# Patient Record
Sex: FEMALE
[De-identification: names, ages, dates, MRNs, and addresses within clinical notes are randomized per-mention and may not be internally consistent; named-entity substitution may affect disease eponyms.]

---

## 2017-04-10 ENCOUNTER — HOSPITAL ENCOUNTER (EMERGENCY)
Dept: HOSPITAL 14 - H.ER | Age: 52
Discharge: HOME | End: 2017-04-10
Payer: SELF-PAY

## 2017-04-10 VITALS
SYSTOLIC BLOOD PRESSURE: 132 MMHG | TEMPERATURE: 98 F | DIASTOLIC BLOOD PRESSURE: 73 MMHG | OXYGEN SATURATION: 100 % | HEART RATE: 83 BPM

## 2017-04-10 VITALS — BODY MASS INDEX: 31.9 KG/M2

## 2017-04-10 VITALS — RESPIRATION RATE: 16 BRPM

## 2017-04-10 DIAGNOSIS — R00.2: ICD-10-CM

## 2017-04-10 DIAGNOSIS — E11.9: ICD-10-CM

## 2017-04-10 DIAGNOSIS — F41.9: Primary | ICD-10-CM

## 2017-04-10 DIAGNOSIS — I10: ICD-10-CM

## 2017-04-10 DIAGNOSIS — E78.00: ICD-10-CM

## 2017-04-10 DIAGNOSIS — R07.9: ICD-10-CM

## 2017-04-10 DIAGNOSIS — Z86.711: ICD-10-CM

## 2017-04-10 LAB
T4 SERPL-MCNC: 10.4 UG/DL (ref 5.5–11)
TSH SERPL-ACNC: 0.13 MIU/ML (ref 0.46–4.68)

## 2017-04-10 NOTE — ED PDOC
HPI: Psych/Substance Abuse


Time Seen by Provider: 04/10/17 14:04


Chief Complaint (Nursing): Chest Pain


History Per: Patient (Anxious, palpitations  worried about health and work. 

[place] MARLOW/HI)


Onset/Duration Of Symptoms: Days (2)


Current Symptoms Are (Timing): Intermittent Episodes


Suicide/Self Injury Attempted (Context): None


Modifying Factor(s): None


Severity: Mild


Associated Symptoms: Anxiety





Past Medical History


Vital Signs: 





 Last Vital Signs











Temp  98.3 F   04/10/17 13:55


 


Pulse  81   04/10/17 13:55


 


Resp  16   04/10/17 13:55


 


BP  156/99 H  04/10/17 13:55


 


Pulse Ox  99   04/10/17 13:55














- Medical History


PMH: Bronchitis, Diabetes, HTN, Hypercholesterolemia, Hyperlipidemia, 

Hypothyroidism, Pulmonary Embolism


   Denies: HIV, Chronic Kidney Disease





- Family History


Family History: States: Unknown Family Hx





- Immunization History


Hx Tetanus Toxoid Vaccination: No


Hx Influenza Vaccination: No


Hx Pneumococcal Vaccination: No





- Home Medications


Home Medications: 


 Ambulatory Orders











 Medication  Instructions  Recorded


 


Glimepiride [Amaryl] 4 mg PO DAILY 01/03/16


 


Metformin HCl 1,000 mg PO BID 01/03/16


 


Ramipril [Altace] 10 mg PO DAILY 01/03/16


 


Cyanocobalamin [Vitamin B12 1000 1,000 mcg PO DAILY #0 tab 01/05/16





mcg Tab]  


 


Levothyroxine [Levoxyl] 0.125 mg PO DAILY 01/25/16


 


Simvastatin 20 mg PO DAILY 01/25/16


 


diaZEpam [Valium] 2 mg PO BID PRN #6 tab 05/21/16


 


Naproxen [Naprosyn] 500 mg PO BID PRN #15 tablet 08/28/16


 


Erythromycin 0.5% [Erythromycin] 1 applic .ROUTE QID #1 tube 09/22/16


 


Acetaminophen with Codeine 1 tab PO Q6H PRN #15 tab 09/25/16





[Tylenol with Codeine No. 3 300  





mg-30 mg]  


 


Clindamycin [Cleocin] 300 mg PO QID #40 cap 09/25/16


 


Albuterol HFA [Ventolin HFA 90 2 puff IH Q4H #1 puff 11/09/16





mcg/actuation (8 g)]  


 


Azithromycin [Zithromax] 250 mg PO DAILY #6 tab 11/09/16


 


predniSONE [predniSONE Tab] 10 mg PO TID #15 tab 11/09/16


 


Albuterol HFA [Ventolin HFA 90 2 puff IH [id number] PRN #1 bottle 11/20/16





mcg/actuation (8 g)]  


 


Levofloxacin [Levaquin] 750 mg PO DAILY #5 tablet 11/20/16


 


Hydroxyzine HCl 25 mg PO HS #6 tablet 04/10/17














- Allergies


Allergies/Adverse Reactions: 


 Allergies











Allergy/AdvReac Type Severity Reaction Status Date / Time


 


Iodine and Iodide Containing Allergy  RASH Verified 02/20/17 23:54





Produc     














Review of Systems


Cardiovascular: Positive for: Palpitations


Psych: Positive for: Anxiety





Physical Exam





- Physical Exam


Appears: Positive for: Non-toxic, No Acute Distress


Skin: Positive for: Normal Color, Warm, DRY


Cardiovascular/Chest: Positive for: Regular Rate, Rhythm


Respiratory: Positive for: CNT, Normal Breath Sounds


Neurologic/Psych: Positive for: Alert, Oriented





- ECG


O2 Sat by Pulse Oximetry: 99





Disposition





- Clinical Impression


Clinical Impression: 


 Anxiety





- Patient ED Disposition


Is Patient to be Admitted: No





- Disposition


Referrals: 


[facility] [Outside]


Disposition: Routine/Home


Disposition Time: 16:14


Condition: FAIR


Prescriptions: 


Hydroxyzine HCl 25 mg PO HS #6 tablet


Instructions:  Anxiety (ED)

## 2017-04-12 ENCOUNTER — HOSPITAL ENCOUNTER (EMERGENCY)
Dept: HOSPITAL 14 - H.ER | Age: 52
Discharge: HOME | End: 2017-04-12
Payer: MEDICAID

## 2017-04-12 VITALS — HEART RATE: 79 BPM | OXYGEN SATURATION: 98 % | TEMPERATURE: 98.1 F

## 2017-04-12 VITALS — DIASTOLIC BLOOD PRESSURE: 84 MMHG | SYSTOLIC BLOOD PRESSURE: 142 MMHG | RESPIRATION RATE: 18 BRPM

## 2017-04-12 VITALS — BODY MASS INDEX: 31.9 KG/M2

## 2017-04-12 DIAGNOSIS — F41.9: ICD-10-CM

## 2017-04-12 DIAGNOSIS — E03.9: Primary | ICD-10-CM

## 2017-04-12 LAB
ALBUMIN/GLOB SERPL: 1.2 {RATIO} (ref 1–2.1)
ALP SERPL-CCNC: 46 U/L (ref 38–126)
ALT SERPL-CCNC: 31 U/L (ref 9–52)
AST SERPL-CCNC: 33 U/L (ref 14–36)
BASOPHILS # BLD AUTO: 0 K/UL (ref 0–0.2)
BASOPHILS NFR BLD: 0.6 % (ref 0–2)
BILIRUB SERPL-MCNC: 0.5 MG/DL (ref 0.2–1.3)
BUN SERPL-MCNC: 12 MG/DL (ref 7–17)
CALCIUM SERPL-MCNC: 9.7 MG/DL (ref 8.4–10.2)
CHLORIDE SERPL-SCNC: 104 MMOL/L (ref 98–107)
CO2 SERPL-SCNC: 28 MMOL/L (ref 22–30)
EOSINOPHIL # BLD AUTO: 0.2 K/UL (ref 0–0.7)
EOSINOPHIL NFR BLD: 4.1 % (ref 0–4)
ERYTHROCYTE [DISTWIDTH] IN BLOOD BY AUTOMATED COUNT: 13.6 % (ref 11.5–14.5)
FT3: 3.32 PG/ML (ref 2.77–5.27)
GLOBULIN SER-MCNC: 3.3 GM/DL (ref 2.2–3.9)
GLUCOSE SERPL-MCNC: 157 MG/DL (ref 65–105)
HCT VFR BLD CALC: 40.9 % (ref 34–47)
LYMPHOCYTES # BLD AUTO: 1.5 K/UL (ref 1–4.3)
LYMPHOCYTES NFR BLD AUTO: 31.3 % (ref 20–40)
MCH RBC QN AUTO: 27.8 PG (ref 27–31)
MCHC RBC AUTO-ENTMCNC: 32.5 G/DL (ref 33–37)
MCV RBC AUTO: 85.5 FL (ref 81–99)
MONOCYTES # BLD: 0.4 K/UL (ref 0–0.8)
MONOCYTES NFR BLD: 8.6 % (ref 0–10)
NEUTROPHILS # BLD: 2.6 K/UL (ref 1.8–7)
NEUTROPHILS NFR BLD AUTO: 55.4 % (ref 50–75)
NRBC BLD AUTO-RTO: 0 % (ref 0–0)
PLATELET # BLD: 179 K/UL (ref 130–400)
PMV BLD AUTO: 9.1 FL (ref 7.2–11.7)
POTASSIUM SERPL-SCNC: 4.6 MMOL/L (ref 3.6–5)
PROT SERPL-MCNC: 7.3 G/DL (ref 6.3–8.2)
SODIUM SERPL-SCNC: 144 MMOL/L (ref 132–148)
TSH SERPL-ACNC: 0.13 MIU/ML (ref 0.46–4.68)
WBC # BLD AUTO: 4.6 K/UL (ref 4.8–10.8)

## 2017-04-12 NOTE — ED PDOC
HPI: General Adult


Time Seen by Provider: 17 14:03


Chief Complaint (Nursing): Anxiety


Chief Complaint (Provider): anxious


History Per: Patient


Additional Complaint(s): 


52 year old female with history of hypothyroidism, HTN and high cholesterol 

presents with increasing anxiety and nervousness for the past few weeks.  

Patient was seen on 4/10/17 for same and was sent home with rx hydroxyzine.  

She is concerned her thyroid meds have to be adjusted.  She states last year 

she has similar symptoms and her thyroid med dose was increased at that time 

and her symptoms resolved.  Patient denies any chest pain, SOB or EDOUARD, denies 

any palpitations but she has panic sensation. No associated suicidal or 

homicidal ideation, no fever or chills.





Past Medical History


Reviewed: Historical Data, Nursing Documentation, Vital Signs


Vital Signs: 


 Last Vital Signs











Temp  98.1 F   17 13:34


 


Pulse  79   17 17:41


 


Resp  18   17 17:41


 


BP  142/84   17 17:41


 


Pulse Ox  98   17 18:33














- Medical History


PMH: Anxiety, Diabetes, HTN, Hypercholesterolemia, Hyperlipidemia, 

Hypothyroidism





- Surgical History


Surgical History: No Surg Hx





- Family History


Family History: States: No Known Family Hx





- Living Arrangements


Living Arrangements: With Family





- Social History


Current smoker - smoking cessation education provided: No


Alcohol: None


Drugs: Denies





- Home Medications


Home Medications: 


 Ambulatory Orders











 Medication  Instructions  Recorded


 


Glimepiride [Amaryl] 4 mg PO DAILY 16


 


Metformin HCl 1,000 mg PO BID 16


 


Ramipril [Altace] 10 mg PO DAILY 16


 


Cyanocobalamin [Vitamin B12 1000 1,000 mcg PO DAILY #0 tab 16





mcg Tab]  


 


Levothyroxine [Levoxyl] 0.125 mg PO DAILY 16


 


Simvastatin 20 mg PO DAILY 16


 


diaZEpam [Valium] 2 mg PO BID PRN #6 tab 16


 


Naproxen [Naprosyn] 500 mg PO BID PRN #15 tablet 16


 


Erythromycin 0.5% [Erythromycin] 1 applic .ROUTE QID #1 tube 16


 


Acetaminophen with Codeine 1 tab PO Q6H PRN #15 tab 16





[Tylenol with Codeine No. 3 300  





mg-30 mg]  


 


Clindamycin [Cleocin] 300 mg PO QID #40 cap 16


 


Albuterol HFA [Ventolin HFA 90 2 puff IH Q4H #1 puff 16





mcg/actuation (8 g)]  


 


Azithromycin [Zithromax] 250 mg PO DAILY #6 tab 16


 


predniSONE [predniSONE Tab] 10 mg PO TID #15 tab 16


 


Albuterol HFA [Ventolin HFA 90 2 puff IH [id number] PRN #1 bottle 16





mcg/actuation (8 g)]  


 


Levofloxacin [Levaquin] 750 mg PO DAILY #5 tablet 16


 


Hydroxyzine HCl 25 mg PO HS #6 tablet 04/10/17


 


Levothyroxine [Synthroid] 125 mcg PO DAILY #30 tab 17














- Allergies


Allergies/Adverse Reactions: 


 Allergies











Allergy/AdvReac Type Severity Reaction Status Date / Time


 


Iodine and Iodide Containing Allergy  RASH Verified 17 23:54





Produc     














Review of Systems


ROS Statement: Except As Marked, All Systems Reviewed And Found Negative


Constitutional: Negative for: Fever, Chills, Weakness


Cardiovascular: Negative for: Chest Pain, Palpitations


Respiratory: Negative for: Shortness of Breath, SOB with Exertion


Gastrointestinal: Negative for: Nausea, Vomiting


Neurological: Negative for: Headache, Dizziness


Psych: Positive for: Anxiety





Physical Exam





- Reviewed


Nursing Documentation Reviewed: Yes


Vital Signs Reviewed: Yes





- Physical Exam


Appears: Positive for: Well, Non-toxic, No Acute Distress


Skin: Negative for: Rash


Eye Exam: Positive for: Normal appearance, EOMI, PERRL


Cardiovascular/Chest: Positive for: Regular Rate, Rhythm


Respiratory: Positive for: Normal Breath Sounds


Extremity: Positive for: Normal ROM.  Negative for: Pedal Edema


Neurologic/Psych: Positive for: Alert, Oriented





- Laboratory Results


Result Diagrams: 


 17 14:25





 17 14:25





- ECG


O2 Sat by Pulse Oximetry: 98


Pulse Ox Interpretation: Normal





Medical Decision Making


Medical Decision Makin year old with anxiety





Plan:


CBC


CMP


TSH


T3


T4





Patient was offered crisis consultation she declines, denies suicidal or 

homicidal ideation.





TSH is low.  I spoke with family practice resident who states to adjust patient'

s levothyroxine dose.  Patient's dose was adjusted from 100 mcg daily to 125 

mcg dialy.  Rx was given.  Patient was instructed to stop current 100 g tablet 

and continue with prescription instead.  Patient has follow up with clinic on  and she was instructed to keep this appointment.  





Disposition





- Clinical Impression


Clinical Impression: 


 Hypothyroidism





- Patient ED Disposition


Is Patient to be Admitted: No


Counseled Patient/Family Regarding: Studies Performed, Diagnosis, Need For 

Followup, Rx Given





- Disposition


Referrals: 


[facility] [Outside]


Disposition: Routine/Home


Disposition Time: 17:28


Condition: STABLE


Additional Instructions: 


Stop taking current thyroid medication and continue with the new dose 

prescribed today. Keep your appointment on  with the clinic for follow-

up. Return any time to ED if acutely worse.


Prescriptions: 


Levothyroxine [Synthroid] 125 mcg PO DAILY #30 tab


Instructions:  Hypothyroidism (ED)





Results





- Lab Results


Lab Results: 

















  17





  14:25


 


WBC  4.6 L


 


RBC  4.79


 


Hgb  13.3


 


Hct  40.9


 


MCV  85.5


 


MCH  27.8


 


MCHC  32.5 L


 


RDW  13.6


 


Plt Count  179


 


MPV  9.1


 


Neut % (Auto)  55.4


 


Lymph % (Auto)  31.3


 


Mono % (Auto)  8.6


 


Eos % (Auto)  4.1 H


 


Baso % (Auto)  0.6


 


Neut #  2.6


 


Lymph #  1.5


 


Mono #  0.4


 


Eos #  0.2


 


Baso #  0.0


 


Sodium  144


 


Potassium  4.6


 


Chloride  104


 


Carbon Dioxide  28


 


Anion Gap  17


 


BUN  12


 


Creatinine  0.6 L


 


Est GFR ( Amer)  > 60


 


Est GFR (Non-Af Amer)  > 60


 


Random Glucose  157 H


 


Calcium  9.7


 


Total Bilirubin  0.5


 


AST  33


 


ALT  31


 


Alkaline Phosphatase  46


 


Total Protein  7.3


 


Albumin  4.0


 


Globulin  3.3


 


Albumin/Globulin Ratio  1.2


 


Free T4  1.25


 


TSH 3rd Generation  0.13 L

## 2017-08-18 ENCOUNTER — HOSPITAL ENCOUNTER (EMERGENCY)
Dept: HOSPITAL 14 - H.ER | Age: 52
Discharge: HOME | End: 2017-08-18
Payer: MEDICAID

## 2017-08-18 VITALS
TEMPERATURE: 97.6 F | HEART RATE: 78 BPM | OXYGEN SATURATION: 98 % | DIASTOLIC BLOOD PRESSURE: 78 MMHG | RESPIRATION RATE: 19 BRPM | SYSTOLIC BLOOD PRESSURE: 128 MMHG

## 2017-08-18 VITALS — BODY MASS INDEX: 31.9 KG/M2

## 2017-08-18 DIAGNOSIS — E11.9: ICD-10-CM

## 2017-08-18 DIAGNOSIS — E78.5: ICD-10-CM

## 2017-08-18 DIAGNOSIS — I10: ICD-10-CM

## 2017-08-18 DIAGNOSIS — F41.9: ICD-10-CM

## 2017-08-18 DIAGNOSIS — Z86.711: ICD-10-CM

## 2017-08-18 DIAGNOSIS — E03.9: Primary | ICD-10-CM

## 2017-08-18 LAB
BASOPHILS # BLD AUTO: 0 K/UL (ref 0–0.2)
BASOPHILS NFR BLD: 0.5 % (ref 0–2)
BUN SERPL-MCNC: 19 MG/DL (ref 7–17)
CALCIUM SERPL-MCNC: 9.2 MG/DL (ref 8.4–10.2)
CHLORIDE SERPL-SCNC: 101 MMOL/L (ref 98–107)
CO2 SERPL-SCNC: 25 MMOL/L (ref 22–30)
EOSINOPHIL # BLD AUTO: 0.1 K/UL (ref 0–0.7)
EOSINOPHIL NFR BLD: 2.4 % (ref 0–4)
ERYTHROCYTE [DISTWIDTH] IN BLOOD BY AUTOMATED COUNT: 13.6 % (ref 11.5–14.5)
GLUCOSE SERPL-MCNC: 207 MG/DL (ref 65–105)
HCT VFR BLD CALC: 38.7 % (ref 34–47)
LYMPHOCYTES # BLD AUTO: 1.5 K/UL (ref 1–4.3)
LYMPHOCYTES NFR BLD AUTO: 31.8 % (ref 20–40)
MCH RBC QN AUTO: 28.2 PG (ref 27–31)
MCHC RBC AUTO-ENTMCNC: 33.4 G/DL (ref 33–37)
MCV RBC AUTO: 84.2 FL (ref 81–99)
MONOCYTES # BLD: 0.4 K/UL (ref 0–0.8)
MONOCYTES NFR BLD: 7.5 % (ref 0–10)
NEUTROPHILS # BLD: 2.7 K/UL (ref 1.8–7)
NEUTROPHILS NFR BLD AUTO: 57.8 % (ref 50–75)
NRBC BLD AUTO-RTO: 0 % (ref 0–0)
PLATELET # BLD: 219 K/UL (ref 130–400)
PMV BLD AUTO: 8.9 FL (ref 7.2–11.7)
POTASSIUM SERPL-SCNC: 4.6 MMOL/L (ref 3.6–5)
SODIUM SERPL-SCNC: 136 MMOL/L (ref 132–148)
TSH SERPL-ACNC: 6.24 MIU/ML (ref 0.46–4.68)
WBC # BLD AUTO: 4.7 K/UL (ref 4.8–10.8)

## 2017-08-18 NOTE — ED PDOC
HPI: General Adult


Time Seen by Provider: 08/18/17 08:13


Chief Complaint (Nursing): Weakness/Neurological Deficit


Chief Complaint (Provider): Generalized Weakness


History Per: Patient


History/Exam Limitations: no limitations


Onset/Duration Of Symptoms: Days (x2 weeks )


Current Symptoms Are (Timing): Still Present


Additional Complaint(s): 





53 y/o female with a past medical history of hypertension, diabetes, and 

hypothyroidism who presents to the emergency department with a complaint of 

generalized weakness, fatigue, low energy, and intermittent nausea x2 weeks. 

States she was experiencing chest pain on/off but had resolved since. Denies 

vomiting, dizziness, syncope, unusual bleeding, diarrhea, fever, or chills.








PMD: Clinic 





Past Medical History


Reviewed: Historical Data, Nursing Documentation, Vital Signs


Vital Signs: 


 Last Vital Signs











Temp  98.4 F   08/18/17 08:21


 


Pulse  82   08/18/17 08:21


 


Resp  20   08/18/17 08:21


 


BP  157/85 H  08/18/17 08:21


 


Pulse Ox  99   08/18/17 09:44














- Medical History


PMH: Anxiety, Bronchitis, Diabetes, HTN, Hypercholesterolemia, Hyperlipidemia, 

Hypothyroidism, Pulmonary Embolism


   Denies: HIV, Chronic Kidney Disease





- Family History


Family History: States: Unknown Family Hx





- Immunization History


Hx Tetanus Toxoid Vaccination: No


Hx Influenza Vaccination: No


Hx Pneumococcal Vaccination: No





- Home Medications


Home Medications: 


 Ambulatory Orders











 Medication  Instructions  Recorded


 


Glimepiride [Amaryl] 4 mg PO DAILY 01/03/16


 


Metformin HCl 1,000 mg PO BID 01/03/16


 


Ramipril [Altace] 10 mg PO DAILY 01/03/16


 


Cyanocobalamin [Vitamin B12 1000 1,000 mcg PO DAILY #0 tab 01/05/16





mcg Tab]  


 


Levothyroxine [Levoxyl] 0.125 mg PO DAILY 01/25/16


 


Simvastatin 20 mg PO DAILY 01/25/16


 


diaZEpam [Valium] 2 mg PO BID PRN #6 tab 05/21/16


 


Naproxen [Naprosyn] 500 mg PO BID PRN #15 tablet 08/28/16


 


Erythromycin 0.5% [Erythromycin] 1 applic .ROUTE QID #1 tube 09/22/16


 


Acetaminophen with Codeine 1 tab PO Q6H PRN #15 tab 09/25/16





[Tylenol with Codeine No. 3 300  





mg-30 mg]  


 


Clindamycin [Cleocin] 300 mg PO QID #40 cap 09/25/16


 


Albuterol HFA [Ventolin HFA 90 2 puff IH Q4H #1 puff 11/09/16





mcg/actuation (8 g)]  


 


Azithromycin [Zithromax] 250 mg PO DAILY #6 tab 11/09/16


 


predniSONE [predniSONE Tab] 10 mg PO TID #15 tab 11/09/16


 


Albuterol HFA [Ventolin HFA 90 2 puff IH [id number] PRN #1 bottle 11/20/16





mcg/actuation (8 g)]  


 


Levofloxacin [Levaquin] 750 mg PO DAILY #5 tablet 11/20/16


 


Hydroxyzine HCl 25 mg PO HS #6 tablet 04/10/17


 


Levothyroxine [Synthroid] 125 mcg PO DAILY #30 tab 04/12/17














- Allergies


Allergies/Adverse Reactions: 


 Allergies











Allergy/AdvReac Type Severity Reaction Status Date / Time


 


Iodine and Iodide Containing Allergy  RASH Verified 08/18/17 08:20





Produc     














Review of Systems


ROS Statement: Except As Marked, All Systems Reviewed And Found Negative


Constitutional: Positive for: Weakness (Generalized), Other (Fatigue and low 

energy).  Negative for: Fever, Chills


Cardiovascular: Positive for: Chest Pain (On/off but had resolved since)


Gastrointestinal: Positive for: Nausea (Intermittent).  Negative for: Vomiting, 

Diarrhea


Neurological: Negative for: Dizziness, Other (Syncope or bleeding)





Physical Exam





- Reviewed


Nursing Documentation Reviewed: Yes


Vital Signs Reviewed: Yes





- Physical Exam


Appears: Positive for: Non-toxic, No Acute Distress


Head Exam: Positive for: ATRAUMATIC, NORMAL INSPECTION, NORMOCEPHALIC


Skin: Positive for: Normal Color, Warm, Dry


Eye Exam: Positive for: Normal appearance


ENT: Positive for: Normal ENT Inspection.  Negative for: Pharyngeal Erythema


Neck: Positive for: Normal, Supple


Cardiovascular/Chest: Positive for: Regular Rate, Rhythm.  Negative for: Murmur


Respiratory: Positive for: Normal Breath Sounds.  Negative for: Accessory 

Muscle Use, Respiratory Distress


Gastrointestinal/Abdominal: Positive for: Normal Exam, Soft.  Negative for: 

Tenderness


Extremity: Positive for: Normal ROM.  Negative for: Pedal Edema


Neurologic/Psych: Positive for: Alert, Oriented





- Laboratory Results


Result Diagrams: 


 08/18/17 09:00





 08/18/17 09:00





- ECG


ECG Rhythm: Positive for: Normal QRS, Normal ST Segment, Sinus Rhythm (Rate at 

69 bpm).  Negative for: ST/T Changes


O2 Sat by Pulse Oximetry: 99 (RA)


Pulse Ox Interpretation: Normal





Medical Decision Making


Medical Decision Making: 





Time: 08:53


Initial impression: Generalized fatigue. Differential includes hypothyroidism, 

UTI, and hyperglycemia


Initial plan:


--EKG


--BMP


--TSH


--Troponin I


--Urine DIP


--Reevaluation 





Scribe Attestation:


Documented by Maite Ewing, acting as a scribe for Ene Whiteside MD.





Provider Scribe Attestation:


All medical record entries made by the Scribe were at my direction and 

personally dictated by me. I have reviewed the chart and agree that the record 

accurately reflects my personal performance of the history, physical exam, 

medical decision making, and the department course for this patient. I have 

also personally directed, reviewed, and agree with the discharge instructions 

and disposition.





Disposition





- Clinical Impression


Clinical Impression: 


 Hypothyroidism








- Patient ED Disposition


Is Patient to be Admitted: No


Doctor Will See Patient In The: Office


Counseled Patient/Family Regarding: Studies Performed, Diagnosis, Need For 

Followup





- Disposition


Referrals: 


[facility] [Outside]


Disposition: Routine/Home


Disposition Time: 10:23


Condition: GOOD


Additional Instructions: 


Drink plenty of fluids. Take your medications as instructed Follow up with your 

PCP in 2-3 days.


Instructions:  Hypothyroidism (ED)

## 2017-08-18 NOTE — CARD
--------------- APPROVED REPORT --------------





EKG Measurement

Heart [id number]

FL [id number]

[id number]

[id number]

[id number]



<Conclusion>

Normal sinus rhythm

Normal ECG

## 2017-11-16 ENCOUNTER — HOSPITAL ENCOUNTER (OUTPATIENT)
Dept: HOSPITAL 14 - H.ER | Age: 52
Setting detail: OBSERVATION
LOS: 1 days | Discharge: HOME | End: 2017-11-17
Attending: FAMILY MEDICINE | Admitting: FAMILY MEDICINE
Payer: MEDICAID

## 2017-11-16 VITALS — BODY MASS INDEX: 30.5 KG/M2

## 2017-11-16 DIAGNOSIS — I10: ICD-10-CM

## 2017-11-16 DIAGNOSIS — E78.00: ICD-10-CM

## 2017-11-16 DIAGNOSIS — E11.9: ICD-10-CM

## 2017-11-16 DIAGNOSIS — E89.0: ICD-10-CM

## 2017-11-16 DIAGNOSIS — Z79.84: ICD-10-CM

## 2017-11-16 DIAGNOSIS — Z87.891: ICD-10-CM

## 2017-11-16 DIAGNOSIS — E87.5: ICD-10-CM

## 2017-11-16 DIAGNOSIS — E78.5: ICD-10-CM

## 2017-11-16 DIAGNOSIS — Z79.899: ICD-10-CM

## 2017-11-16 DIAGNOSIS — J40: ICD-10-CM

## 2017-11-16 DIAGNOSIS — Z79.82: ICD-10-CM

## 2017-11-16 DIAGNOSIS — Z83.3: ICD-10-CM

## 2017-11-16 DIAGNOSIS — F39: ICD-10-CM

## 2017-11-16 DIAGNOSIS — Z86.711: ICD-10-CM

## 2017-11-16 DIAGNOSIS — F41.9: ICD-10-CM

## 2017-11-16 DIAGNOSIS — R07.89: Primary | ICD-10-CM

## 2017-11-16 DIAGNOSIS — Z85.850: ICD-10-CM

## 2017-11-16 DIAGNOSIS — Z98.51: ICD-10-CM

## 2017-11-16 LAB
APTT BLD: 25.7 SECONDS (ref 25.6–37.1)
BASOPHILS # BLD AUTO: 0 K/UL (ref 0–0.2)
BASOPHILS NFR BLD: 0.4 % (ref 0–2)
BUN SERPL-MCNC: 20 MG/DL (ref 7–17)
CALCIUM SERPL-MCNC: 9.8 MG/DL (ref 8.4–10.2)
CHLORIDE SERPL-SCNC: 103 MMOL/L (ref 98–107)
CO2 SERPL-SCNC: 28 MMOL/L (ref 22–30)
EOSINOPHIL # BLD AUTO: 0.1 K/UL (ref 0–0.7)
EOSINOPHIL NFR BLD: 2 % (ref 0–4)
ERYTHROCYTE [DISTWIDTH] IN BLOOD BY AUTOMATED COUNT: 14.3 % (ref 11.5–14.5)
GLUCOSE SERPL-MCNC: 104 MG/DL (ref 65–105)
HCT VFR BLD CALC: 40.4 % (ref 34–47)
LYMPHOCYTES # BLD AUTO: 3.2 K/UL (ref 1–4.3)
LYMPHOCYTES NFR BLD AUTO: 43.2 % (ref 20–40)
MAGNESIUM SERPL-MCNC: 1.5 MG/DL (ref 1.6–2.3)
MCH RBC QN AUTO: 28.1 PG (ref 27–31)
MCHC RBC AUTO-ENTMCNC: 32.9 G/DL (ref 33–37)
MCV RBC AUTO: 85.3 FL (ref 81–99)
MONOCYTES # BLD: 0.6 K/UL (ref 0–0.8)
MONOCYTES NFR BLD: 7.8 % (ref 0–10)
NEUTROPHILS # BLD: 3.5 K/UL (ref 1.8–7)
NEUTROPHILS NFR BLD AUTO: 46.6 % (ref 50–75)
NRBC BLD AUTO-RTO: 0.1 % (ref 0–0)
PLATELET # BLD: 212 K/UL (ref 130–400)
PMV BLD AUTO: 8.6 FL (ref 7.2–11.7)
POTASSIUM SERPL-SCNC: 4.8 MMOL/L (ref 3.6–5)
SODIUM SERPL-SCNC: 143 MMOL/L (ref 132–148)
WBC # BLD AUTO: 7.4 K/UL (ref 4.8–10.8)

## 2017-11-16 PROCEDURE — 80061 LIPID PANEL: CPT

## 2017-11-16 PROCEDURE — 93005 ELECTROCARDIOGRAM TRACING: CPT

## 2017-11-16 PROCEDURE — 99285 EMERGENCY DEPT VISIT HI MDM: CPT

## 2017-11-16 PROCEDURE — 82948 REAGENT STRIP/BLOOD GLUCOSE: CPT

## 2017-11-16 PROCEDURE — 85025 COMPLETE CBC W/AUTO DIFF WBC: CPT

## 2017-11-16 PROCEDURE — 84443 ASSAY THYROID STIM HORMONE: CPT

## 2017-11-16 PROCEDURE — 83735 ASSAY OF MAGNESIUM: CPT

## 2017-11-16 PROCEDURE — 71010: CPT

## 2017-11-16 PROCEDURE — 80053 COMPREHEN METABOLIC PANEL: CPT

## 2017-11-16 PROCEDURE — 36415 COLL VENOUS BLD VENIPUNCTURE: CPT

## 2017-11-16 PROCEDURE — 85730 THROMBOPLASTIN TIME PARTIAL: CPT

## 2017-11-16 PROCEDURE — 96372 THER/PROPH/DIAG INJ SC/IM: CPT

## 2017-11-16 PROCEDURE — 85610 PROTHROMBIN TIME: CPT

## 2017-11-16 PROCEDURE — 80048 BASIC METABOLIC PNL TOTAL CA: CPT

## 2017-11-16 PROCEDURE — 84484 ASSAY OF TROPONIN QUANT: CPT

## 2017-11-16 NOTE — ED PDOC
HPI: General Adult


Time Seen by Provider: 11/16/17 16:33


Chief Complaint (Nursing): Dizziness/Lightheaded


Chief Complaint (Provider): CHEST PAIN


History Per: Patient (51 Y/O FEMALE H/O DM/HTN/HLD HERE WITH INTERMITTENT CHEST 

TIGHTNESS INVOLVING NECK X 3 DAYS ASSOCIATED WITH PALPITATIONS.  PATIENT STATES 

HER FATHER IS ILL AND IS UNSURE WHETHER CHEST PAIN IS RELATED TO ANXIETY/

STRESS.  DENIES ANY FEVERS/CHILLS.COUGH.)





Past Medical History


Reviewed: Historical Data, Nursing Documentation, Vital Signs


Vital Signs: 





 Last Vital Signs











Temp  98 F   11/16/17 14:29


 


Pulse  80   11/16/17 14:29


 


Resp  18   11/16/17 14:29


 


BP  152/68 H  11/16/17 14:29


 


Pulse Ox  98   11/16/17 14:29














- Medical History


PMH: Anxiety, Bronchitis, Diabetes, HTN, Hypercholesterolemia, Hyperlipidemia, 

Hypothyroidism, Pulmonary Embolism


   Denies: HIV, Chronic Kidney Disease





- Family History


Family History: States: Unknown Family Hx





- Immunization History


Hx Tetanus Toxoid Vaccination: No


Hx Influenza Vaccination: No


Hx Pneumococcal Vaccination: No





- Home Medications


Home Medications: 


 Ambulatory Orders











 Medication  Instructions  Recorded


 


Glimepiride [Amaryl] 4 mg PO DAILY 01/03/16


 


Metformin HCl 1,000 mg PO BID 01/03/16


 


Ramipril [Altace] 10 mg PO DAILY 01/03/16


 


Cyanocobalamin [Vitamin B12 1000 1,000 mcg PO DAILY #0 tab 01/05/16





mcg Tab]  


 


Levothyroxine [Levoxyl] 0.125 mg PO DAILY 01/25/16


 


Simvastatin 20 mg PO DAILY 01/25/16


 


diaZEpam [Valium] 2 mg PO BID PRN #6 tab 05/21/16


 


Naproxen [Naprosyn] 500 mg PO BID PRN #15 tablet 08/28/16


 


Erythromycin 0.5% [Erythromycin] 1 applic .ROUTE QID #1 tube 09/22/16


 


Acetaminophen with Codeine 1 tab PO Q6H PRN #15 tab 09/25/16





[Tylenol with Codeine No. 3 300  





mg-30 mg]  


 


Clindamycin [Cleocin] 300 mg PO QID #40 cap 09/25/16


 


Albuterol HFA [Ventolin HFA 90 2 puff IH Q4H #1 puff 11/09/16





mcg/actuation (8 g)]  


 


Azithromycin [Zithromax] 250 mg PO DAILY #6 tab 11/09/16


 


predniSONE [predniSONE Tab] 10 mg PO TID #15 tab 11/09/16


 


Albuterol HFA [Ventolin HFA 90 2 puff IH [id number] PRN #1 bottle 11/20/16





mcg/actuation (8 g)]  


 


Levofloxacin [Levaquin] 750 mg PO DAILY #5 tablet 11/20/16


 


Hydroxyzine HCl 25 mg PO HS #6 tablet 04/10/17


 


Levothyroxine [Synthroid] 125 mcg PO DAILY #30 tab 04/12/17














- Allergies


Allergies/Adverse Reactions: 


 Allergies











Allergy/AdvReac Type Severity Reaction Status Date / Time


 


Iodine and Iodide Containing Allergy  RASH Verified 08/18/17 08:20





Produc     














Review of Systems


ROS Statement: Except As Marked, All Systems Reviewed And Found Negative





Physical Exam





- Reviewed


Nursing Documentation Reviewed: Yes


Vital Signs Reviewed: Yes





- Physical Exam


Appears: Positive for: Well, Non-toxic, No Acute Distress


Head Exam: Positive for: ATRAUMATIC, NORMAL INSPECTION, NORMOCEPHALIC


Skin: Positive for: Normal Color, Warm, DRY


Eye Exam: Positive for: EOMI, Normal appearance, PERRL


ENT: Positive for: Normal ENT Inspection


Neck: Positive for: Normal, Painless ROM


Cardiovascular/Chest: Positive for: Regular Rate, Rhythm


Respiratory: Positive for: CNT, Normal Breath Sounds


Gastrointestinal/Abdominal: Positive for: Normal Exam, Bowel Sounds, Soft


Back: Positive for: Normal Inspection


Extremity: Positive for: Normal ROM


Neurologic/Psych: Positive for: Alert, Oriented





- Laboratory Results


Result Diagrams: 


 11/16/17 15:20





 11/16/17 15:20





- ECG


ECG Rhythm: Positive for: Sinus Rhythm (NSR NO ECTOPY NO ACUTE CHANGES NOTED.)


O2 Sat by Pulse Oximetry: 98





- Progress


ED Course And Treament: 





CXR: NAD





 MG X 1 DOSE 





XANAX 0.5 MG X 1 DOSE





D/W FAMILY MEDICINE RESIDENT FOR TELE OBSERVATION .





Disposition





- Clinical Impression


Clinical Impression: 


 Atypical chest pain








- Patient ED Disposition


Is Patient to be Admitted: Yes





- Disposition


Disposition Time: 16:30


Condition: FAIR


Forms:  [facility] (English)





- Pt Status Changed To:


Hospital Disposition Of: Observation

## 2017-11-16 NOTE — RAD
PROCEDURE:  CHEST RADIOGRAPH, 1 VIEW



HISTORY:

chest pain



COMPARISON:

Chest radiograph 01/20/2017



FINDINGS:



LUNGS:

No interval infiltrates bilaterally.



PLEURA:

No pneumothorax or pleural fluid seen.



CARDIOVASCULAR:

Normal.



OSSEOUS STRUCTURES:

No significant abnormalities.



VISUALIZED UPPER ABDOMEN:

Normal.



OTHER FINDINGS:

None. 



IMPRESSION:

No interval acute cardiopulmonary disease appreciated.

## 2017-11-16 NOTE — CP.PCM.HP
History of Present Illness





- History of Present Illness


History of Present Illness: 


53 yo female PMH DMII, HTN, hyperlipidemia, thyroid CA s/p thyroidectomy, 

hypothyroidism, PE, anxiety and mood disorder presents to [facility] ED w/ complaints 

of intermittent chest pressure and chest pain that radiates anterior neck  for 

over a year, getting worse last 15 days. Pt reports she has multiple stressor 

including her father admitted to hospital and job related issues making her 

chest pain/pressure worse. Reports chest pain/pressure 7/10, partially relieved 

by rubbing on chest. Denies any nausea, vomiting, dyspnea, cough, upper 

extremity weakness/numbness, fever or chills. Denies dyspnea on exertion or 

orthopnea. Denies any recent travel or long distance trip. Pt had a stress test 

with Dr. Trujillo on 9/28/17 which showed mildy reduced effort tolerance and no 

evidence of myocardial ischemia was detected. 


PMD: [facility]


PMH: DMII, HTN, Hyperlipidemia,  thyroid CA s/p thyroidetomy,hypothyroidism, 

pulmonary embolism 9 yrs ago, anxiety, mood disorder.


Home medications: Aspirin 81 mg QD, levothyroxine 75 mcg QD, ramipril 10 mg QD, 

simvastatin 20 mg QD, metformin 1000 mg BID, glimepiride 4 mg QD, klonopin 0.5 

mg prn


family hx: sister has hx DMII. Denies any family hx HTN, HLD, CAD or CA.


Surgery hx: thyroidectomy


social hx: Former smoker, used to smoked 7 cigarettes/day x 1 yrs, quit 9 yrs 

ago, Denies EtOH use or other recreational drug use.


Allergies: Iodine





ED Course And Treament: 


EKG: NSR at 74 bpm. No acute changes noted.


CXR: no acute cardiopulmonary disease


CBC, CMP, Troponin neg x 1


 mg X 1 dose


Xanax  0.5 MG X 1 dose


magnesium sulfate 1 gm 








Present on Admission





- Present on Admission


Any Indicators Present on Admission: Yes


History of DVT/PE: Yes


History of Uncontrolled Diabetes: No


Urinary Catheter: No


Decubitus Ulcer Present: No





Review of Systems





- Constitutional


Constitutional: absent: Chills, Fever





- EENT


Eyes: absent: Blurred Vision


Nose/Mouth/Throat: absent: Odynophagia, Sore Throat, Neck Mass





- Cardiovascular


Cardiovascular: absent: Dyspnea, Dyspnea on Exertion, Lightheadedness, Orthopnea

, Palpitations





- Respiratory


Respiratory: absent: Cough, Dyspnea





- Gastrointestinal


Gastrointestinal: absent: Constipation, Nausea, Vomiting





- Musculoskeletal


Musculoskeletal: absent: Muscle Weakness, Radiating Pain into Limb





- Neurological


Neurological: absent: Abnormal Gait, Dizziness, Focal Weakness





- Endocrine


Endocrine: absent: Palpitations





Past Patient History





- Infectious Disease


Hx of Infectious Diseases: None





- Past Medical History & Family History


Past Medical History?: Yes





- Past Social History


Smoking Status: Former Smoker





- CARDIAC


Hx Hypercholesterolemia: Yes


Hx Hypertension: Yes





- PULMONARY


Hx Bronchitis: Yes


Hx Pulmonary Embolism: Yes





- NEUROLOGICAL


Hx Neurological Disorder: No





- HEENT


Hx HEENT Problems: No





- RENAL


Hx Chronic Kidney Disease: No





- ENDOCRINE/METABOLIC


Hx Hypothyroidism: Yes





- HEMATOLOGICAL/ONCOLOGICAL


Hx Human Immunodeficiency Virus (HIV): No





- INTEGUMENTARY


Hx Dermatological Problems: No





- MUSCULOSKELETAL/RHEUMATOLOGICAL


Hx Musculoskeletal Disorders: No





- GASTROINTESTINAL


Hx Gastrointestinal Disorders: No





- GENITOURINARY/GYNECOLOGICAL


Hx Genitourinary Disorders: No





- PSYCHIATRIC


Hx Anxiety: Yes


Hx Substance Use: No





- SURGICAL HISTORY


Hx Surgeries: Yes


Hx Thyroidectomy: Yes


Hx Tubal Ligation: Yes





- ANESTHESIA


Hx Anesthesia: Yes


Hx Anesthesia Reactions: No





Meds


Allergies/Adverse Reactions: 


 Allergies











Allergy/AdvReac Type Severity Reaction Status Date / Time


 


Iodine and Iodide Containing Allergy  RASH Verified 08/18/17 08:20





Produc     














Physical Exam





- Constitutional


Appears: No Acute Distress





- Head Exam


Head Exam: ATRAUMATIC, NORMAL INSPECTION, NORMOCEPHALIC





- Eye Exam


Eye Exam: EOMI, Normal appearance, PERRL





- ENT Exam


ENT Exam: Mucous Membranes Moist





- Neck Exam


Neck exam: Positive for: Normal Inspection


Additional comments: 





surgical scar on anterior neck





- Respiratory Exam


Respiratory Exam: Clear to Auscultation Bilateral.  absent: Rales, Rhonchi, 

Wheezes





- Cardiovascular Exam


Cardiovascular Exam: REGULAR RHYTHM, RRR, +S1, +S2.  absent: Gallop





- GI/Abdominal Exam


GI & Abdominal Exam: Normal Bowel Sounds, Soft.  absent: Rebound, Tenderness





- Extremities Exam


Extremities exam: Positive for: normal capillary refill.  Negative for: calf 

tenderness





- Neurological Exam


Neurological exam: Alert, CN II-XII Intact, Normal Gait, Oriented x3, Reflexes 

Normal





- Psychiatric Exam


Psychiatric exam: Anxious


Additional comments: 





tearful





- Skin


Skin Exam: Normal Color





Results





- Vital Signs


Recent Vital Signs: 





 Last Vital Signs











Temp  98 F   11/16/17 17:42


 


Pulse  80   11/16/17 17:42


 


Resp  18   11/16/17 17:42


 


BP  152/68 H  11/16/17 17:42


 


Pulse Ox  98   11/16/17 16:46














- Labs


Result Diagrams: 


 11/16/17 15:20





 11/16/17 15:20


Labs: 





 Laboratory Results - last 24 hr











  11/16/17 11/16/17





  15:20 15:20


 


WBC   7.4  D


 


RBC   4.74


 


Hgb   13.3


 


Hct   40.4


 


MCV   85.3


 


MCH   28.1


 


MCHC   32.9 L


 


RDW   14.3


 


Plt Count   212


 


MPV   8.6


 


Neut % (Auto)   46.6 L


 


Lymph % (Auto)   43.2 H


 


Mono % (Auto)   7.8


 


Eos % (Auto)   2.0


 


Baso % (Auto)   0.4


 


Neut #   3.5


 


Lymph #   3.2


 


Mono #   0.6


 


Eos #   0.1


 


Baso #   0.0


 


Sodium  143 


 


Potassium  4.8 


 


Chloride  103 


 


Carbon Dioxide  28 


 


Anion Gap  17 


 


BUN  20 H 


 


Creatinine  0.8 


 


Est GFR ( Amer)  > 60 


 


Est GFR (Non-Af Amer)  > 60 


 


Random Glucose  104 


 


Calcium  9.8 


 


Magnesium  1.5 L 


 


Troponin I  < 0.0120 














Assessment & Plan





- Assessment and Plan (Free Text)


Assessment: 





Assessment: 





53 yo female PMH DMII, HTN, hyperlipidemia, PE, anxiety and mood disorder 

presents to [facility] ED for atypical chest pain r/o ACS





Plan: 





1. Atypical chest pain


-Admit to tele for continuous cardiac monitoring.


-EKG: NSR at 74 bpm.


-CXR: no acute cardiopulmonary disease.


-Troponin neg x 1


-Troponin q8h x 2 ordered.


-Received one dose xanax 0.5 mg in ED.


-Repeat EKG in tomorrow AM.





2. Hypomagnesemia


-Magnesium: 1.5


-Received 1 gm magnesium sulfate in ED.


-Repeat CMP tomorrow AM.





3. NIDDM II, controlled.


-HbA1c 6.8 on 11/13/17


-Start home medications.


-Metformin 1000 mg BID


-Glimepiride 4 mg QD.





4. Hypertension:


-Continue with home medication.


-Ramipril 10 mg QD.





5. Hyperlipidemia:


-Continue with home medication.


-Simvastatin 20 mg QD.


-Lipid panel tomorrow AM.





6. Hypothyroidism:


-Continue with home medication.


-Levothyroxine 75 mcg QD


-TSH tomorrow AM





7. DVT prophylaxis:


-SCD prn


-Start Lovenox 40 mg SC





8. Diet: 


-Consistent carbohydrate diet.








- Date & Time


Date: 11/16/17


Time: 19:35

## 2017-11-17 VITALS
DIASTOLIC BLOOD PRESSURE: 67 MMHG | TEMPERATURE: 98.3 F | SYSTOLIC BLOOD PRESSURE: 113 MMHG | OXYGEN SATURATION: 100 % | HEART RATE: 63 BPM | RESPIRATION RATE: 20 BRPM

## 2017-11-17 LAB
ALBUMIN/GLOB SERPL: 1.4 {RATIO} (ref 1–2.1)
ALP SERPL-CCNC: 42 U/L (ref 38–126)
ALT SERPL-CCNC: 31 U/L (ref 9–52)
AST SERPL-CCNC: 27 U/L (ref 14–36)
BILIRUB SERPL-MCNC: 0.5 MG/DL (ref 0.2–1.3)
BUN SERPL-MCNC: 16 MG/DL (ref 7–17)
CALCIUM SERPL-MCNC: 9.5 MG/DL (ref 8.4–10.2)
CHLORIDE SERPL-SCNC: 106 MMOL/L (ref 98–107)
CHOLEST SERPL-MCNC: 126 MG/DL (ref 0–199)
CO2 SERPL-SCNC: 30 MMOL/L (ref 22–30)
GLOBULIN SER-MCNC: 2.9 GM/DL (ref 2.2–3.9)
GLUCOSE SERPL-MCNC: 78 MG/DL (ref 65–105)
POTASSIUM SERPL-SCNC: 5.4 MMOL/L (ref 3.6–5)
PROT SERPL-MCNC: 7 G/DL (ref 6.3–8.2)
SODIUM SERPL-SCNC: 144 MMOL/L (ref 132–148)
TSH SERPL-ACNC: 0.46 MIU/ML (ref 0.46–4.68)

## 2017-11-17 NOTE — CP.PCM.DIS
Provider





- Provider


Date of Admission: 


11/16/17 16:33





Attending physician: 


Brigida Moore MD





[facility] 





[facility]


Time Spent in preparation of Discharge (in minutes): 30





Hospital Course





- Lab Results


Lab Results: 


 Most Recent Lab Values











WBC  7.4 K/uL (4.8-10.8)  D 11/16/17  15:20    


 


RBC  4.74 Mil/uL (3.80-5.20)   11/16/17  15:20    


 


Hgb  13.3 g/dL (12.0-16.0)   11/16/17  15:20    


 


Hct  40.4 % (34.0-47.0)   11/16/17  15:20    


 


MCV  85.3 fl (81.0-99.0)   11/16/17  15:20    


 


MCH  28.1 pg (27.0-31.0)   11/16/17  15:20    


 


MCHC  32.9 g/dL (33.0-37.0)  L  11/16/17  15:20    


 


RDW  14.3 % (11.5-14.5)   11/16/17  15:20    


 


Plt Count  212 K/uL (130-400)   11/16/17  15:20    


 


MPV  8.6 fl (7.2-11.7)   11/16/17  15:20    


 


Neut % (Auto)  46.6 % (50.0-75.0)  L  11/16/17  15:20    


 


Lymph % (Auto)  43.2 % (20.0-40.0)  H  11/16/17  15:20    


 


Mono % (Auto)  7.8 % (0.0-10.0)   11/16/17  15:20    


 


Eos % (Auto)  2.0 % (0.0-4.0)   11/16/17  15:20    


 


Baso % (Auto)  0.4 % (0.0-2.0)   11/16/17  15:20    


 


Neut #  3.5 K/uL (1.8-7.0)   11/16/17  15:20    


 


Lymph #  3.2 K/uL (1.0-4.3)   11/16/17  15:20    


 


Mono #  0.6 K/uL (0.0-0.8)   11/16/17  15:20    


 


Eos #  0.1 K/uL (0.0-0.7)   11/16/17  15:20    


 


Baso #  0.0 K/uL (0.0-0.2)   11/16/17  15:20    


 


PT  10.5 Seconds (9.8-13.1)   11/16/17  19:39    


 


INR  0.9  (0.9-1.2)   11/16/17  19:39    


 


APTT  25.7 Seconds (25.6-37.1)   11/16/17  19:39    


 


Sodium  144 mmol/l (132-148)   11/17/17  04:47    


 


Potassium  5.4 MMOL/L (3.6-5.0)  H  11/17/17  04:47    


 


Chloride  106 mmol/L ()   11/17/17  04:47    


 


Carbon Dioxide  30 mmol/L (22-30)   11/17/17  04:47    


 


Anion Gap  13  (10-20)   11/17/17  04:47    


 


BUN  16 mg/dl (7-17)   11/17/17  04:47    


 


Creatinine  0.8 mg/dl (0.7-1.2)   11/17/17  04:47    


 


Est GFR ( Amer)  > 60   11/17/17  04:47    


 


Est GFR (Non-Af Amer)  > 60   11/17/17  04:47    


 


POC Glucose (mg/dL)  72 mg/dL ()   11/17/17  06:16    


 


Random Glucose  78 mg/dL ()   11/17/17  04:47    


 


Calcium  9.5 mg/dL (8.4-10.2)   11/17/17  04:47    


 


Magnesium  1.5 MG/DL (1.6-2.3)  L  11/16/17  15:20    


 


Total Bilirubin  0.5 mg/dl (0.2-1.3)   11/17/17  04:47    


 


AST  27 U/L (14-36)   11/17/17  04:47    


 


ALT  31 U/L (9-52)   11/17/17  04:47    


 


Alkaline Phosphatase  42 U/L ()   11/17/17  04:47    


 


Troponin I  < 0.0120 ng/mL (0.00-0.120)   11/17/17  00:30    


 


Total Protein  7.0 G/DL (6.3-8.2)   11/17/17  04:47    


 


Albumin  4.1 g/dL (3.5-5.0)   11/17/17  04:47    


 


Globulin  2.9 gm/dL (2.2-3.9)   11/17/17  04:47    


 


Albumin/Globulin Ratio  1.4  (1.0-2.1)   11/17/17  04:47    


 


Triglycerides  115 mg/DL (0-149)   11/17/17  04:47    


 


Cholesterol  126 mg/dL (0-199)   11/17/17  04:47    


 


LDL Cholesterol Direct  48 mg/dL (0-129)   11/17/17  04:47    


 


HDL Cholesterol  48 MG/DL (30-70)   11/17/17  04:47    


 


TSH 3rd Generation  0.46 mIU/ML (0.46-4.68)   11/17/17  04:47    














- Hospital Course


Hospital Course: 


53 yo female PMH DMII, HTN, hyperlipidemia, hypothyroidism, PE, anxiety and 

mood disorder presents to [facility] ED w/ complaints of intermittent chest pressure 

and chest pain that radiates anterior neck  for over a year, getting worse last 

15 days. Pt reports she has multiple stressor including her father admitted to 

hospital and job related issues making her chest pain/pressure worse. Pt was 

admitted to [facility] tele for continuos cardiac monitor and to r/o ACS. Pt had 

normal CXR, negative troponin x 2. Pt's last chest pain was in ED. Ramipril was 

discontinued as it might be causing her hyperkalemia ( K: 5.4). Pt had well 

controlled BP. Pt started on Sertraline 25 mg QD. Pt has appointment with Dr. Roberts on 12/6/17 at 1 pm and advised to f/u with appointment. Advised to f/u 

with her psychiatrist Dr. Chen.  








Discharge Exam





- Head Exam


Head Exam: NORMAL INSPECTION





- Respiratory Exam


Respiratory Exam: Clear to PA & Lateral, NORMAL BREATHING PATTERN.  absent: 

Chest Wall Tenderness, Rhonchi, Wheezes





- Cardiovascular Exam


Cardiovascular Exam: REGULAR RHYTHM, RRR, +S1, +S2.  absent: JVD





- GI/Abdominal Exam


GI & Abdominal Exam: Normal Bowel Sounds, Soft.  absent: Tenderness





- Neurological Exam


Neurological exam: Alert, Normal Gait, Oriented x3





- Psychiatric Exam


Psychiatric exam: Normal Affect, Normal Mood





- Skin


Skin Exam: Normal Color





Discharge Plan





- Discharge Medications


Prescriptions: 


Sertraline [Zoloft] 50 mg PO DAILY #30 tab





- Follow Up Plan


Condition: FAIR


Disposition: HOME/ ROUTINE


Instructions:  Chest Pain (DC)

## 2017-11-17 NOTE — CP.PCM.PN
Subjective





- Date & Time of Evaluation


Date of Evaluation: 11/17/17


Time of Evaluation: 07:35





Objective





- Vital Signs/Intake and Output


Vital Signs (last 24 hours): 


 











Temp Pulse Resp BP Pulse Ox


 


 98.3 F   63   20   113/67   100 


 


 11/17/17 08:00  11/17/17 08:00  11/17/17 08:00  11/17/17 08:00  11/17/17 08:00











- Medications


Medications: 


 Current Medications





Acetaminophen (Tylenol 325mg Tab)  650 mg PO Q6 PRN


   PRN Reason: Pain, Mild (1-3)


Atorvastatin Calcium (Lipitor)  10 mg PO DAILY [facility]


   Last Admin: 11/17/17 08:37 Dose:  10 mg


Docusate Sodium (Colace)  100 mg PO BID PRN


   PRN Reason: Constipation


Enoxaparin Sodium (Lovenox)  40 mg SC DAILY [facility]


   PRN Reason: Protocol


   Last Admin: 11/17/17 08:38 Dose:  40 mg


Glipizide (Glucotrol Xl)  10 mg PO DAILY [facility]


   Last Admin: 11/17/17 08:37 Dose:  10 mg


Levothyroxine Sodium (Synthroid)  88 mcg PO DAILY@0630 [facility]


   Last Admin: 11/17/17 06:16 Dose:  88 mcg


Metformin HCl (Glucophage)  1,000 mg PO BID [facility]


   Last Admin: 11/17/17 08:37 Dose:  1,000 mg











- Labs


Labs: 


 





 11/16/17 15:20 





 11/17/17 04:47 





 











PT  10.5 Seconds (9.8-13.1)   11/16/17  19:39    


 


INR  0.9  (0.9-1.2)   11/16/17  19:39    


 


APTT  25.7 Seconds (25.6-37.1)   11/16/17  19:39

## 2017-11-17 NOTE — CARD
--------------- APPROVED REPORT --------------





EKG Measurement

Heart [id number]

CT 198P29

[id number]

[id number]

[id number]



<Conclusion>

Normal sinus rhythm

Normal ECG

## 2017-11-30 ENCOUNTER — HOSPITAL ENCOUNTER (EMERGENCY)
Dept: HOSPITAL 14 - H.ER | Age: 52
Discharge: HOME | End: 2017-11-30
Payer: MEDICAID

## 2017-11-30 VITALS
HEART RATE: 66 BPM | SYSTOLIC BLOOD PRESSURE: 149 MMHG | OXYGEN SATURATION: 99 % | TEMPERATURE: 98.9 F | DIASTOLIC BLOOD PRESSURE: 77 MMHG

## 2017-11-30 VITALS — BODY MASS INDEX: 30.5 KG/M2

## 2017-11-30 VITALS — RESPIRATION RATE: 16 BRPM

## 2017-11-30 DIAGNOSIS — F41.9: ICD-10-CM

## 2017-11-30 DIAGNOSIS — R42: Primary | ICD-10-CM

## 2017-11-30 DIAGNOSIS — Z86.711: ICD-10-CM

## 2017-11-30 DIAGNOSIS — I10: ICD-10-CM

## 2017-11-30 DIAGNOSIS — E03.9: ICD-10-CM

## 2017-11-30 DIAGNOSIS — E11.9: ICD-10-CM

## 2017-11-30 DIAGNOSIS — E78.00: ICD-10-CM

## 2017-11-30 LAB
ALBUMIN/GLOB SERPL: 1.4 {RATIO} (ref 1–2.1)
ALP SERPL-CCNC: 47 U/L (ref 38–126)
ALT SERPL-CCNC: 37 U/L (ref 9–52)
AST SERPL-CCNC: 34 U/L (ref 14–36)
BASOPHILS # BLD AUTO: 0 K/UL (ref 0–0.2)
BASOPHILS NFR BLD: 0.5 % (ref 0–2)
BILIRUB SERPL-MCNC: 0.6 MG/DL (ref 0.2–1.3)
BUN SERPL-MCNC: 14 MG/DL (ref 7–17)
CALCIUM SERPL-MCNC: 9.6 MG/DL (ref 8.4–10.2)
CHLORIDE SERPL-SCNC: 105 MMOL/L (ref 98–107)
CO2 SERPL-SCNC: 28 MMOL/L (ref 22–30)
EOSINOPHIL # BLD AUTO: 0.1 K/UL (ref 0–0.7)
EOSINOPHIL NFR BLD: 2.6 % (ref 0–4)
ERYTHROCYTE [DISTWIDTH] IN BLOOD BY AUTOMATED COUNT: 14 % (ref 11.5–14.5)
GLOBULIN SER-MCNC: 3 GM/DL (ref 2.2–3.9)
GLUCOSE SERPL-MCNC: 151 MG/DL (ref 65–105)
HCT VFR BLD CALC: 41.1 % (ref 34–47)
LYMPHOCYTES # BLD AUTO: 1.5 K/UL (ref 1–4.3)
LYMPHOCYTES NFR BLD AUTO: 33.9 % (ref 20–40)
MCH RBC QN AUTO: 28.2 PG (ref 27–31)
MCHC RBC AUTO-ENTMCNC: 32.9 G/DL (ref 33–37)
MCV RBC AUTO: 85.6 FL (ref 81–99)
MONOCYTES # BLD: 0.4 K/UL (ref 0–0.8)
MONOCYTES NFR BLD: 9.5 % (ref 0–10)
NEUTROPHILS # BLD: 2.4 K/UL (ref 1.8–7)
NEUTROPHILS NFR BLD AUTO: 53.5 % (ref 50–75)
NRBC BLD AUTO-RTO: 0.1 % (ref 0–0)
PLATELET # BLD: 204 K/UL (ref 130–400)
PMV BLD AUTO: 8.7 FL (ref 7.2–11.7)
POTASSIUM SERPL-SCNC: 4.3 MMOL/L (ref 3.6–5)
PROT SERPL-MCNC: 7.4 G/DL (ref 6.3–8.2)
SODIUM SERPL-SCNC: 145 MMOL/L (ref 132–148)
WBC # BLD AUTO: 4.4 K/UL (ref 4.8–10.8)

## 2017-11-30 NOTE — ED PDOC
HPI: General Adult


Time Seen by Provider: 11/30/17 05:32


Chief Complaint (Nursing): Dizziness/Lightheaded


Chief Complaint (Provider): Dizziness/Weakness


History Per: Patient


History/Exam Limitations: no limitations


Onset/Duration Of Symptoms: Days (x9)


Current Symptoms Are (Timing): Still Present


Additional Complaint(s): 


Saba Haile is a 52 year old  female with a past medical history of HTN

, PE, diabetes, and hypothyroidism who presents to the ED with a sense of 

dizziness and weakness x9 days. Patient reports the anti-hypertensive that she 

previously used was recently discontinued after being admitted to hospital for 

2 days. At that time patient was made aware of elevated potassium levels and 

advised to stop taking Ramipril. States since stopping Ramipril she has a sense 

of dizziness and feels concerned. Also reports intermittent chest pain. Denies 

any nausea, vomiting, and diaphoresis. Patient reports feeling stressed 

regarding her father who was recently admitted to the hospital and transferred 

to a long term care facility. 





PMD: Brigida La MD








Past Medical History


Reviewed: Historical Data, Nursing Documentation, Vital Signs


Vital Signs: 


 Last Vital Signs











Temp  98.7 F   11/30/17 05:21


 


Pulse  80   11/30/17 05:21


 


Resp  16   11/30/17 05:21


 


BP  162/94 H  11/30/17 05:21


 


Pulse Ox  98   11/30/17 06:07














- Medical History


PMH: Anxiety, Bronchitis, Diabetes, HTN, Hypercholesterolemia, Hyperlipidemia, 

Hypothyroidism, Pulmonary Embolism


   Denies: HIV, Chronic Kidney Disease





- Surgical History


Surgical History: No Surg Hx





- Family History


Family History: States: Unknown Family Hx





- Social History


Current smoker - smoking cessation education provided: No


Alcohol: None


Drugs: Denies





- Immunization History


Hx Tetanus Toxoid Vaccination: No


Hx Influenza Vaccination: No


Hx Pneumococcal Vaccination: No





- Home Medications


Home Medications: 


 Ambulatory Orders











 Medication  Instructions  Recorded


 


Glimepiride [Amaryl] 4 mg PO DAILY 01/03/16


 


Metformin HCl 1,000 mg PO BID 01/03/16


 


Simvastatin 20 mg PO DAILY 01/25/16


 


Levothyroxine [Synthroid] 75 mcg PO DAILY 11/16/17


 


Sertraline [Zoloft] 50 mg PO DAILY #30 tab 11/17/17














- Allergies


Allergies/Adverse Reactions: 


 Allergies











Allergy/AdvReac Type Severity Reaction Status Date / Time


 


Iodine and Iodide Containing Allergy  RASH Verified 08/18/17 08:20





Produc     


 


ACE Inhibitors AdvReac  Hyperkalemi Verified 11/17/17 10:09





   a  














Review of Systems


ROS Statement: Except As Marked, All Systems Reviewed And Found Negative


Constitutional: Positive for: Weakness.  Negative for: Sweats


Gastrointestinal: Negative for: Nausea, Vomiting


Neurological: Positive for: Dizziness





Physical Exam





- Reviewed


Nursing Documentation Reviewed: Yes


Vital Signs Reviewed: Yes





- Physical Exam


Appears: Positive for: Well, Non-toxic, No Acute Distress


Head Exam: Positive for: ATRAUMATIC, NORMAL INSPECTION, NORMOCEPHALIC


Skin: Positive for: Normal Color, Warm, Dry


Eye Exam: Positive for: EOMI, Normal appearance, PERRL


Neck: Positive for: Normal, Painless ROM, Supple


Cardiovascular/Chest: Positive for: Regular Rate, Rhythm.  Negative for: Murmur


Respiratory: Positive for: Normal Breath Sounds.  Negative for: Respiratory 

Distress


Gastrointestinal/Abdominal: Positive for: Normal Exam, Bowel Sounds, Soft.  

Negative for: Tenderness


Back: Positive for: Normal Inspection.  Negative for: L CVA Tenderness, R CVA 

Tenderness, Vertebral Tenderness


Extremity: Positive for: Normal ROM.  Negative for: Pedal Edema, Deformity


Neurologic/Psych: Positive for: Alert, Oriented.  Negative for: Motor/Sensory 

Deficits





- Laboratory Results


Result Diagrams: 


 11/30/17 06:06








- ECG


O2 Sat by Pulse Oximetry: 98 (RA)


Pulse Ox Interpretation: Normal





Medical Decision Making


Medical Decision Making: 





Time: 05:48


Initial Impression: 51 y/o female with generalized dizziness and weakness in 

setting of recent change in her anti-hypertensive regimen


Plan:


--EKG


--CMP


--Troponin I


--CBC w/ differential


--Heplock insertion


--Accucheck 


--Reevaluation





Patient signed out to Dr Whiteside at 7AM pending labs





--------------------------------------------------------------------------------

-----------------


Scribe Attestation:


Documented by Francois Bempong acting as a scribe for Shailesh Cosme MD.


   


MD Scribe Attestation:


All medical record entries made by the Scribe were at my direction and 

personally dictated by me. I have reviewed the chart and agree that the record 

accurately reflects my personal performance of the history, physical exam, 

medical decision making, and the department course for this patient. I have 

also personally directed, reviewed, and agree with the discharge instructions 

and disposition.








Disposition





- Clinical Impression


Clinical Impression: 


 Dizziness








- Patient ED Disposition


Is Patient to be Admitted: Transfer of Care





- Disposition


Disposition: Transfer of Care


Disposition Time: 07:00


Condition: FAIR


Forms:  [employer] (English)


Patient Signed Over To: Ene Whiteside

## 2017-11-30 NOTE — ED PDOC
- Laboratory Results


Result Diagrams: 


 17 06:06








- ECG


O2 Sat by Pulse Oximetry: 98 (RA)





Medical Decision Making


Medical Decision Makin


-Patient transferred to me by Dr. Cosme, pending labs. 





Disposition





- Clinical Impression


Clinical Impression: 


 Dizziness








- Disposition


Condition: FAIR


Forms:  [employer] (English)

## 2017-11-30 NOTE — CARD
--------------- APPROVED REPORT --------------





EKG Measurement

Heart [id number]

TX 198P38

[id number]

[id number]

[id number]



<Conclusion>

Normal sinus rhythm

Normal ECG

## 2018-02-01 ENCOUNTER — HOSPITAL ENCOUNTER (EMERGENCY)
Dept: HOSPITAL 14 - H.ER | Age: 53
LOS: 1 days | Discharge: HOME | End: 2018-02-02
Payer: MEDICAID

## 2018-02-01 VITALS — DIASTOLIC BLOOD PRESSURE: 86 MMHG | SYSTOLIC BLOOD PRESSURE: 175 MMHG

## 2018-02-01 VITALS — OXYGEN SATURATION: 96 % | HEART RATE: 78 BPM | RESPIRATION RATE: 16 BRPM | TEMPERATURE: 97 F

## 2018-02-01 VITALS — BODY MASS INDEX: 28.4 KG/M2

## 2018-02-01 DIAGNOSIS — I10: ICD-10-CM

## 2018-02-01 DIAGNOSIS — Z79.84: ICD-10-CM

## 2018-02-01 DIAGNOSIS — E11.9: ICD-10-CM

## 2018-02-01 DIAGNOSIS — Z86.711: ICD-10-CM

## 2018-02-01 DIAGNOSIS — J11.1: Primary | ICD-10-CM

## 2018-02-02 NOTE — ED PDOC
HPI: General Adult


Time Seen by Provider: 02/01/18 23:38


Chief Complaint (Nursing): Flu-like Symptoms


Chief Complaint (Provider): Flu-like Symptoms


History Per: Patient


History/Exam Limitations: no limitations


Onset/Duration Of Symptoms: Days (x 1)


Current Symptoms Are (Timing): Still Present


Additional Complaint(s): 





Saba is a 51 y/o female who was exposed to the flu 3 days ago and now 

presents to the ED complaining of sore throat, chills, body aches, and fever. 

She is concerned she has the flu.





PMD: None Provided





Past Medical History


Reviewed: Historical Data, Nursing Documentation, Vital Signs


Vital Signs: 


 Last Vital Signs











Temp  97.0 F L  02/01/18 22:58


 


Pulse  78   02/01/18 22:58


 


Resp  16   02/01/18 22:58


 


BP  175/86 H  02/01/18 22:59


 


Pulse Ox  96   02/02/18 01:11














- Medical History


PMH: Anxiety, Bronchitis, Diabetes, HTN, Hypercholesterolemia, Hyperlipidemia, 

Hypothyroidism, Pulmonary Embolism


   Denies: HIV, Chronic Kidney Disease





- Family History


Family History: States: Unknown Family Hx





- Immunization History


Hx Tetanus Toxoid Vaccination: No


Hx Influenza Vaccination: No


Hx Pneumococcal Vaccination: No





- Home Medications


Home Medications: 


 Ambulatory Orders











 Medication  Instructions  Recorded


 


Glimepiride [Amaryl] 4 mg PO DAILY 01/03/16


 


Metformin HCl 1,000 mg PO BID 01/03/16


 


Simvastatin 20 mg PO DAILY 01/25/16


 


Levothyroxine [Synthroid] 75 mcg PO DAILY 11/16/17


 


Sertraline [Zoloft] 50 mg PO DAILY #30 tab 11/17/17


 


Oseltamivir Phosphate [Tamiflu] 75 mg PO BID 5 Days #10 capsule 02/02/18














- Allergies


Allergies/Adverse Reactions: 


 Allergies











Allergy/AdvReac Type Severity Reaction Status Date / Time


 


Iodine and Iodide Containing Allergy  RASH Verified 08/18/17 08:20





Produc     


 


ACE Inhibitors AdvReac  Hyperkalemi Verified 11/17/17 10:09





   a  














Review of Systems


ROS Statement: Except As Marked, All Systems Reviewed And Found Negative


Constitutional: Positive for: Fever, Chills, Malaise


ENT: Positive for: Throat Pain





Physical Exam





- Reviewed


Nursing Documentation Reviewed: Yes


Vital Signs Reviewed: Yes





- Physical Exam


Appears: Positive for: Well, Non-toxic, No Acute Distress


Head Exam: Positive for: ATRAUMATIC, NORMAL INSPECTION, NORMOCEPHALIC


Skin: Positive for: Normal Color, Warm, Dry


Eye Exam: Positive for: EOMI, Normal appearance, PERRL


ENT: Positive for: Normal ENT Inspection


Neck: Positive for: Normal, Painless ROM, Supple


Cardiovascular/Chest: Positive for: Regular Rate, Rhythm.  Negative for: Murmur


Respiratory: Positive for: Normal Breath Sounds.  Negative for: Respiratory 

Distress


Gastrointestinal/Abdominal: Positive for: Normal Exam, Bowel Sounds, Soft.  

Negative for: Tenderness


Back: Positive for: Normal Inspection


Extremity: Positive for: Normal ROM.  Negative for: Pedal Edema, Deformity


Neurologic/Psych: Positive for: Alert, Oriented





- ECG


O2 Sat by Pulse Oximetry: 96 (RA)


Pulse Ox Interpretation: Normal





Medical Decision Making


Medical Decision Making: 





Time: 23:41


Initial Impression: Influenza


Initial Plan:


--Motrin


--Tamiflu


--Flu Swab





Time: 00:39


--Flu Swab: Negative for A/B





Time: 2:04


--Patient stable for discharge home





--------------------------------------------------------------------------------

-----------------


Scribe Attestation:


Documented by Herbert Aldrich, acting as a scribe for Dr. Rohit Holly MD





Provider Scribe Attestation:


All medical record entries made by the Scribe were at my direction and 

personally dictated by me. I have reviewed the chart and agree that the record 

accurately reflects my personal performance of the history, physical exam, 

medical decision making, and the department course for this patient. I have 

also personally directed, reviewed, and agree with the discharge instructions 

and disposition.








Disposition





- Clinical Impression


Clinical Impression: 


 Influenza








- Patient ED Disposition


Is Patient to be Admitted: No


Counseled Patient/Family Regarding: Studies Performed, Diagnosis, Rx Given





- Disposition


Referrals: 


Wali Galloway MD [Family Provider] - 


Disposition: Routine/Home


Disposition Time: 02:04


Condition: STABLE


Prescriptions: 


Oseltamivir Phosphate [Tamiflu] 75 mg PO BID 5 Days #10 capsule


Instructions:  Influenza (ED)


Forms:  [facility] (English)

## 2018-05-10 ENCOUNTER — HOSPITAL ENCOUNTER (EMERGENCY)
Dept: HOSPITAL 14 - H.ER | Age: 53
Discharge: HOME | End: 2018-05-10
Payer: MEDICAID

## 2018-05-10 VITALS — RESPIRATION RATE: 18 BRPM

## 2018-05-10 VITALS — HEART RATE: 67 BPM | OXYGEN SATURATION: 97 %

## 2018-05-10 VITALS — SYSTOLIC BLOOD PRESSURE: 133 MMHG | DIASTOLIC BLOOD PRESSURE: 75 MMHG | TEMPERATURE: 98.9 F

## 2018-05-10 VITALS — BODY MASS INDEX: 29.3 KG/M2

## 2018-05-10 DIAGNOSIS — E78.00: ICD-10-CM

## 2018-05-10 DIAGNOSIS — F41.9: Primary | ICD-10-CM

## 2018-05-10 DIAGNOSIS — Z79.84: ICD-10-CM

## 2018-05-10 DIAGNOSIS — E03.9: ICD-10-CM

## 2018-05-10 DIAGNOSIS — E11.9: ICD-10-CM

## 2018-05-10 DIAGNOSIS — I10: ICD-10-CM

## 2018-05-10 DIAGNOSIS — Z86.711: ICD-10-CM

## 2018-05-10 LAB
ALBUMIN SERPL-MCNC: 4.6 G/DL (ref 3.5–5)
ALBUMIN/GLOB SERPL: 1.3 {RATIO} (ref 1–2.1)
ALT SERPL-CCNC: 33 U/L (ref 9–52)
AST SERPL-CCNC: 29 U/L (ref 14–36)
BASOPHILS # BLD AUTO: 0 K/UL (ref 0–0.2)
BASOPHILS NFR BLD: 0.4 % (ref 0–2)
BILIRUB UR-MCNC: NEGATIVE MG/DL
BUN SERPL-MCNC: 19 MG/DL (ref 7–17)
CALCIUM SERPL-MCNC: 10.3 MG/DL (ref 8.4–10.2)
COLOR UR: YELLOW
EOSINOPHIL # BLD AUTO: 0.1 K/UL (ref 0–0.7)
EOSINOPHIL NFR BLD: 2.3 % (ref 0–4)
ERYTHROCYTE [DISTWIDTH] IN BLOOD BY AUTOMATED COUNT: 14.3 % (ref 11.5–14.5)
GFR NON-AFRICAN AMERICAN: > 60
GLUCOSE UR STRIP-MCNC: (no result) MG/DL
HGB BLD-MCNC: 13.9 G/DL (ref 12–16)
LEUKOCYTE ESTERASE UR-ACNC: (no result) LEU/UL
LYMPHOCYTES # BLD AUTO: 1.6 K/UL (ref 1–4.3)
LYMPHOCYTES NFR BLD AUTO: 28.5 % (ref 20–40)
MCH RBC QN AUTO: 28.9 PG (ref 27–31)
MCHC RBC AUTO-ENTMCNC: 34.1 G/DL (ref 33–37)
MCV RBC AUTO: 84.8 FL (ref 81–99)
MONOCYTES # BLD: 0.4 K/UL (ref 0–0.8)
MONOCYTES NFR BLD: 8.1 % (ref 0–10)
NEUTROPHILS # BLD: 3.3 K/UL (ref 1.8–7)
NEUTROPHILS NFR BLD AUTO: 60.7 % (ref 50–75)
NRBC BLD AUTO-RTO: 0.2 % (ref 0–0)
PH UR STRIP: 7 [PH] (ref 5–8)
PLATELET # BLD: 246 K/UL (ref 130–400)
PMV BLD AUTO: 8.5 FL (ref 7.2–11.7)
PROT UR STRIP-MCNC: NEGATIVE MG/DL
RBC # BLD AUTO: 4.8 MIL/UL (ref 3.8–5.2)
RBC # UR STRIP: NEGATIVE /UL
SP GR UR STRIP: 1.01 (ref 1–1.03)
T4 SERPL-MCNC: 9.12 UG/DL (ref 5.5–11)
URINE CLARITY: CLEAR
UROBILINOGEN UR-MCNC: (no result) MG/DL (ref 0.2–1)
WBC # BLD AUTO: 5.5 K/UL (ref 4.8–10.8)

## 2018-05-10 NOTE — ED PDOC
HPI: General Adult


Time Seen by Provider: 05/10/18 10:18


Chief Complaint (Nursing): Chest Pain


Chief Complaint (Provider): Tremulous


History Per: Patient


Onset/Duration Of Symptoms: Days (x2 months)


Current Symptoms Are (Timing): Still Present


Additional Complaint(s): 





Saba Haile is a 53 year old female, with a past medical history of HTN, 

hypothyroidism and diabetes, who presents to the emergency department 

complaining of not feeling well onset for x2 months. Patient reports feeling 

tremulous and having a lot of energy "going down her leg" and doesn't know what 

to do with it. Patient also reports having hot flashes and feeling overwhelmed. 

She denies any suicidal or homicidal ideation, no fever, chills, nausea, vomit, 

or diarrhea. No further medical complaints.





PMD: None provided. 





Past Medical History


Reviewed: Historical Data, Nursing Documentation, Vital Signs


Vital Signs: 


 Last Vital Signs











Temp  98.9 F   05/10/18 13:58


 


Pulse  79   05/10/18 13:58


 


Resp  18   05/10/18 13:58


 


BP  133/75   05/10/18 13:58


 


Pulse Ox  98   05/10/18 13:58














- Medical History


PMH: Anxiety, Bronchitis, Diabetes, HTN, Hypercholesterolemia, Hyperlipidemia, 

Hypothyroidism, Pulmonary Embolism


   Denies: HIV, Chronic Kidney Disease





- Surgical History


Surgical History: No Surg Hx





- Family History


Family History: States: Unknown Family Hx





- Social History


Ex-Smoker (has not smoked in the last 12 months): Yes


Alcohol: None


Drugs: Denies





- Immunization History


Hx Tetanus Toxoid Vaccination: No


Hx Influenza Vaccination: No


Hx Pneumococcal Vaccination: No





- Home Medications


Home Medications: 


 Ambulatory Orders











 Medication  Instructions  Recorded


 


Glimepiride [Amaryl] 4 mg PO DAILY 01/03/16


 


Metformin HCl 1,000 mg PO BID 01/03/16


 


Simvastatin 20 mg PO DAILY 01/25/16


 


Levothyroxine [Synthroid] 75 mcg PO DAILY 11/16/17


 


Sertraline [Zoloft] 50 mg PO DAILY #30 tab 11/17/17


 


Oseltamivir Phosphate [Tamiflu] 75 mg PO BID 5 Days #10 capsule 02/02/18














- Allergies


Allergies/Adverse Reactions: 


 Allergies











Allergy/AdvReac Type Severity Reaction Status Date / Time


 


Iodine and Iodide Containing Allergy  RASH Verified 08/18/17 08:20





Produc     


 


ACE Inhibitors AdvReac  Hyperkalemi Verified 11/17/17 10:09





   a  














Review of Systems


ROS Statement: Except As Marked, All Systems Reviewed And Found Negative


Constitutional: Positive for: Other (hot flashes).  Negative for: Fever, Chills


Gastrointestinal: Negative for: Nausea, Vomiting, Diarrhea


Psych: Positive for: Other (tremulous ).  Negative for: Suicidal ideation (

homicidal ideation)





Physical Exam





- Reviewed


Nursing Documentation Reviewed: Yes


Vital Signs Reviewed: Yes





- Physical Exam


Appears: Positive for: Non-toxic, No Acute Distress


Head Exam: Positive for: ATRAUMATIC, NORMOCEPHALIC


Skin: Positive for: Normal Color, Warm, Dry


Eye Exam: Positive for: Normal appearance, EOMI, PERRL


Neck: Positive for: Painless ROM


Cardiovascular/Chest: Positive for: Regular Rate, Rhythm.  Negative for: Murmur


Respiratory: Positive for: Normal Breath Sounds.  Negative for: Respiratory 

Distress


Gastrointestinal/Abdominal: Positive for: Normal Exam, Soft.  Negative for: 

Tenderness


Back: Positive for: Normal Inspection.  Negative for: L CVA Tenderness, R CVA 

Tenderness, Vertebral Tenderness


Extremity: Positive for: Normal ROM (upper and lower extremities).  Negative for

: Deformity, Swelling


Neurologic/Psych: Positive for: Alert, Oriented.  Negative for: Motor/Sensory 

Deficits





- Laboratory Results


Result Diagrams: 


 05/10/18 11:30





 05/10/18 11:30





- ECG


ECG Rhythm: Positive for: Sinus Rhythm


Rate: 67


O2 Sat by Pulse Oximetry: 97 (RA)


Pulse Ox Interpretation: Normal





Medical Decision Making


Medical Decision Making: 





Initial Impression: agitation, feeling restless, R/o thyroid abnormality, 

anxiety, infection





Initial Plan:





--CMP


--T4


--TSH


--Troponin I


--CBC w/ differential


--Chest portable [RAD]


--Urine C&S 


--Urinalysis


--Reevaluation





11:51


CXR


FINDINGS:





LUNGS:


No active pulmonary disease.





PLEURA:


No significant pleural effusion identified, no pneumothorax apparent.





CARDIOVASCULAR:


 No radiographic findings to suggest acute or significant cardiovascular 

disease.





OSSEOUS STRUCTURES:


No significant abnormalities.





VISUALIZED UPPER ABDOMEN:


Normal.





OTHER FINDINGS:


None.





IMPRESSION:


No active disease. No significant interval change compared to the prior 

examination(s).





cxr and bloodwork unremarkable. 


pt reevaluated, feels better and is calmer.


discussed results w patients. 





13:40


-Patient is medically and psychiatrically clear for discharge. diagnosed with 

anxiety per Dr. Sutherland. Counseling was provided and all questions were answered 

regarding diagnosis and need for follow up with PMD in 2 days. There is 

agreement to discharge plan. Return if symptoms persist or worsen.





--------------------------------------------------------------------------------

-----------------   


Scribe Attestation:


Documented by Franklin Little, acting as a scribe for Thong Holbrook MD





Provider Scribe Attestation:


All medical record entries made by the Scribe were at my direction and 

personally dictated by me. I have reviewed the chart and agree that the record 

accurately reflects my personal performance of the history, physical exam, 

medical decision making, and the department course for this patient. I have 

also personally directed, reviewed, and agree with the discharge instructions 

and disposition.





Disposition





- Clinical Impression


Clinical Impression: 


 Anxiety








- Patient ED Disposition


Is Patient to be Admitted: No


Counseled Patient/Family Regarding: Studies Performed, Diagnosis, Need For 

Followup





- Disposition


Referrals: 


[facility] [Outside]


[facility] [Outside]


Disposition: Routine/Home


Disposition Time: 13:15


Condition: IMPROVED


Additional Instructions: 


follow up with your primary doctor in 1-2 days


return to the ED with any worsening or concerning symptoms


Instructions:  Anxiety, Adult (DC)


Forms:  [facility] (English)

## 2018-05-10 NOTE — RAD
HISTORY:

anxiety  



COMPARISON:

11/16/2017 



FINDINGS:



LUNGS:

No active pulmonary disease.



PLEURA:

No significant pleural effusion identified, no pneumothorax apparent.



CARDIOVASCULAR:

 No radiographic findings to suggest acute or significant 

cardiovascular disease.



OSSEOUS STRUCTURES:

No significant abnormalities.



VISUALIZED UPPER ABDOMEN:

Normal.



OTHER FINDINGS:

None.



IMPRESSION:

No active disease. No significant interval change compared to the 

prior examination(s).

## 2018-07-30 ENCOUNTER — HOSPITAL ENCOUNTER (EMERGENCY)
Dept: HOSPITAL 14 - H.ER | Age: 53
Discharge: HOME | End: 2018-07-30
Payer: MEDICAID

## 2018-07-30 VITALS
DIASTOLIC BLOOD PRESSURE: 80 MMHG | HEART RATE: 67 BPM | SYSTOLIC BLOOD PRESSURE: 144 MMHG | TEMPERATURE: 99.1 F | OXYGEN SATURATION: 100 % | RESPIRATION RATE: 18 BRPM

## 2018-07-30 VITALS — BODY MASS INDEX: 29.3 KG/M2

## 2018-07-30 DIAGNOSIS — I10: ICD-10-CM

## 2018-07-30 DIAGNOSIS — E11.9: ICD-10-CM

## 2018-07-30 DIAGNOSIS — E78.00: ICD-10-CM

## 2018-07-30 DIAGNOSIS — K52.9: Primary | ICD-10-CM

## 2018-07-30 DIAGNOSIS — Z86.711: ICD-10-CM

## 2018-07-30 DIAGNOSIS — E03.9: ICD-10-CM

## 2018-07-30 DIAGNOSIS — Z79.84: ICD-10-CM

## 2018-07-30 LAB
ALBUMIN SERPL-MCNC: 4.2 G/DL (ref 3.5–5)
ALBUMIN/GLOB SERPL: 1.3 {RATIO} (ref 1–2.1)
ALT SERPL-CCNC: 27 U/L (ref 9–52)
AST SERPL-CCNC: 30 U/L (ref 14–36)
BASOPHILS # BLD AUTO: 0 K/UL (ref 0–0.2)
BASOPHILS NFR BLD: 0.2 % (ref 0–2)
BUN SERPL-MCNC: 21 MG/DL (ref 7–17)
CALCIUM SERPL-MCNC: 9.2 MG/DL (ref 8.4–10.2)
EOSINOPHIL # BLD AUTO: 0.1 K/UL (ref 0–0.7)
EOSINOPHIL NFR BLD: 1.2 % (ref 0–4)
ERYTHROCYTE [DISTWIDTH] IN BLOOD BY AUTOMATED COUNT: 14.3 % (ref 11.5–14.5)
GFR NON-AFRICAN AMERICAN: > 60
HGB BLD-MCNC: 13.8 G/DL (ref 12–16)
LYMPHOCYTES # BLD AUTO: 0.6 K/UL (ref 1–4.3)
LYMPHOCYTES NFR BLD AUTO: 10 % (ref 20–40)
MCH RBC QN AUTO: 28.7 PG (ref 27–31)
MCHC RBC AUTO-ENTMCNC: 33.4 G/DL (ref 33–37)
MCV RBC AUTO: 86.1 FL (ref 81–99)
MONOCYTES # BLD: 0.4 K/UL (ref 0–0.8)
MONOCYTES NFR BLD: 6 % (ref 0–10)
NEUTROPHILS # BLD: 5 K/UL (ref 1.8–7)
NEUTROPHILS NFR BLD AUTO: 82.6 % (ref 50–75)
NRBC BLD AUTO-RTO: 0.1 % (ref 0–0)
PLATELET # BLD: 190 K/UL (ref 130–400)
PMV BLD AUTO: 8.5 FL (ref 7.2–11.7)
RBC # BLD AUTO: 4.81 MIL/UL (ref 3.8–5.2)
WBC # BLD AUTO: 6 K/UL (ref 4.8–10.8)

## 2018-07-30 PROCEDURE — 96361 HYDRATE IV INFUSION ADD-ON: CPT

## 2018-07-30 PROCEDURE — 99285 EMERGENCY DEPT VISIT HI MDM: CPT

## 2018-07-30 PROCEDURE — 85025 COMPLETE CBC W/AUTO DIFF WBC: CPT

## 2018-07-30 PROCEDURE — 96374 THER/PROPH/DIAG INJ IV PUSH: CPT

## 2018-07-30 PROCEDURE — 80053 COMPREHEN METABOLIC PANEL: CPT

## 2018-07-30 NOTE — ED PDOC
HPI: Abdomen


Time Seen by Provider: 07/30/18 08:39


Chief Complaint (Nursing): GI Problem


Chief Complaint (Provider): abdominal pain


History Per: Patient


History/Exam Limitations: no limitations


Onset/Duration Of Symptoms: Hrs (this morning)


Current Symptoms Are (Timing): Still Present


Location Of Pain/Discomfort: Epigastric


Associated Symptoms: Nausea, Vomiting.  denies: Fever, Chills, Diarrhea


Additional Complaint(s): 





Saba Haile is a 53 year old female, with a past medical history of diabetes, 

who presents to the emergency department complaining of an epigastric pain 

associated with nausea and vomiting x2 onset since this morning. Patient denies 

any fever, chills or diarrhea. No further medical complaints.





PMD: None provided. 





Past Medical History


Reviewed: Historical Data, Nursing Documentation, Vital Signs


Vital Signs: 


 Last Vital Signs











Temp  98.2 F   07/30/18 07:46


 


Pulse  83   07/30/18 07:46


 


Resp  19   07/30/18 07:46


 


BP  147/74   07/30/18 07:46


 


Pulse Ox  98   07/30/18 08:55














- Medical History


PMH: Anxiety, Bronchitis, Diabetes, HTN, Hypercholesterolemia, Hyperlipidemia, 

Hypothyroidism, Pulmonary Embolism


   Denies: Hepatitis, HIV, Chronic Kidney Disease, Seizures, Sexually 

Transmitted Disease





- Surgical History


Surgical History: No Surg Hx





- Family History


Family History: States: Unknown Family Hx





- Social History


Current smoker - smoking cessation education provided: No


Alcohol: None


Drugs: Denies





- Immunization History


Hx Tetanus Toxoid Vaccination: No


Hx Influenza Vaccination: No


Hx Pneumococcal Vaccination: No





- Home Medications


Home Medications: 


 Ambulatory Orders











 Medication  Instructions  Recorded


 


Glimepiride [Amaryl] 4 mg PO DAILY 01/03/16


 


Metformin HCl 1,000 mg PO BID 01/03/16


 


Simvastatin 20 mg PO DAILY 01/25/16


 


Levothyroxine [Synthroid] 75 mcg PO DAILY 11/16/17


 


Sertraline [Zoloft] 50 mg PO DAILY #30 tab 11/17/17


 


Oseltamivir Phosphate [Tamiflu] 75 mg PO BID 5 Days #10 capsule 02/02/18


 


Famotidine [Pepcid] 20 mg PO Q12 #20 tab 07/30/18


 


Ondansetron [Zofran] 4 mg PO Q8H #10 tab 07/30/18














- Allergies


Allergies/Adverse Reactions: 


 Allergies











Allergy/AdvReac Type Severity Reaction Status Date / Time


 


Iodine and Iodide Containing Allergy  RASH Verified 08/18/17 08:20





Produc     


 


ACE Inhibitors AdvReac  Hyperkalemi Verified 11/17/17 10:09





   a  














Review of Systems


ROS Statement: Except As Marked, All Systems Reviewed And Found Negative


Constitutional: Negative for: Fever, Chills


Gastrointestinal: Positive for: Nausea, Vomiting, Abdominal Pain.  Negative for

: Diarrhea, Hematochezia





Physical Exam





- Reviewed


Nursing Documentation Reviewed: Yes


Vital Signs Reviewed: Yes





- Physical Exam


Appears: Positive for: Non-toxic, No Acute Distress


Head Exam: Positive for: ATRAUMATIC, NORMAL INSPECTION, NORMOCEPHALIC


Skin: Positive for: Normal Color, Warm, Dry


Eye Exam: Positive for: Normal appearance, EOMI, PERRL


ENT: Positive for: Normal ENT Inspection (mucous membranes moist)


Neck: Positive for: Painless ROM


Cardiovascular/Chest: Positive for: Regular Rate, Rhythm.  Negative for: Murmur


Respiratory: Positive for: Normal Breath Sounds.  Negative for: Respiratory 

Distress


Gastrointestinal/Abdominal: Positive for: Tenderness (mild epigastric)


Extremity: Positive for: Normal ROM (full ROM on all extremities).  Negative for

: Deformity, Swelling


Neurologic/Psych: Positive for: Alert, Oriented.  Negative for: Motor/Sensory 

Deficits





- Laboratory Results


Result Diagrams: 


 07/30/18 09:39





 07/30/18 09:39





- ECG


O2 Sat by Pulse Oximetry: 98 (RA)


Pulse Ox Interpretation: Normal





- Progress


Re-evaluation Time: 11:01


Condition: Improved





Medical Decision Making


Medical Decision Making: 





Time: 08:39


Initial Plan:





--CMP


--Urine dipstick


--CBC w/ differential


--Sodium Chloride 1,000 ml  mls/hr


--Zofran Inj 4 mg IVP


--Reevaluation








--------------------------------------------------------------------------------

-----





Scribe Attestation:


Documented by Franklin Little, acting as a scribe for Ananth Hanks MD.





Provider Scribe Attestation:


All medical record entries made by the Scribe were at my direction and 

personally dictated by me. I have reviewed the chart and agree that the record 

accurately reflects my personal performance of the history, physical exam, 

medical decision making, and the department course for this patient. I have 

also personally directed, reviewed, and agree with the discharge instructions 

and disposition.





Disposition





- Clinical Impression


Clinical Impression: 


 Gastroenteritis








- Patient ED Disposition


Is Patient to be Admitted: No


Counseled Patient/Family Regarding: Studies Performed, Diagnosis, Need For 

Followup, Rx Given





- Disposition


Referrals: 


[facility] [Outside]


Disposition: Routine/Home


Disposition Time: 11:01


Condition: FAIR


Prescriptions: 


Famotidine [Pepcid] 20 mg PO Q12 #20 tab


Ondansetron [Zofran] 4 mg PO Q8H #10 tab


Instructions:  Viral Gastroenteritis


Forms:  [facility] (English)

## 2018-08-09 ENCOUNTER — HOSPITAL ENCOUNTER (EMERGENCY)
Dept: HOSPITAL 14 - H.ER | Age: 53
Discharge: HOME | End: 2018-08-09
Payer: COMMERCIAL

## 2018-08-09 VITALS
DIASTOLIC BLOOD PRESSURE: 78 MMHG | TEMPERATURE: 98.7 F | RESPIRATION RATE: 15 BRPM | HEART RATE: 73 BPM | SYSTOLIC BLOOD PRESSURE: 123 MMHG

## 2018-08-09 VITALS — BODY MASS INDEX: 29.3 KG/M2

## 2018-08-09 DIAGNOSIS — F41.9: ICD-10-CM

## 2018-08-09 DIAGNOSIS — D25.9: ICD-10-CM

## 2018-08-09 DIAGNOSIS — N39.0: Primary | ICD-10-CM

## 2018-08-09 DIAGNOSIS — E03.9: ICD-10-CM

## 2018-08-09 DIAGNOSIS — Z86.711: ICD-10-CM

## 2018-08-09 DIAGNOSIS — I10: ICD-10-CM

## 2018-08-09 DIAGNOSIS — E78.00: ICD-10-CM

## 2018-08-09 DIAGNOSIS — Z79.84: ICD-10-CM

## 2018-08-09 DIAGNOSIS — E11.9: ICD-10-CM

## 2018-08-09 LAB
ALBUMIN SERPL-MCNC: 4.4 G/DL (ref 3.5–5)
ALBUMIN/GLOB SERPL: 1.3 {RATIO} (ref 1–2.1)
ALT SERPL-CCNC: 33 U/L (ref 9–52)
AST SERPL-CCNC: 39 U/L (ref 14–36)
BACTERIA #/AREA URNS HPF: (no result) /[HPF]
BASOPHILS # BLD AUTO: 0 K/UL (ref 0–0.2)
BASOPHILS NFR BLD: 0.7 % (ref 0–2)
BILIRUB UR-MCNC: NEGATIVE MG/DL
BUN SERPL-MCNC: 22 MG/DL (ref 7–17)
CALCIUM SERPL-MCNC: 9.6 MG/DL (ref 8.4–10.2)
COLOR UR: YELLOW
EOSINOPHIL # BLD AUTO: 0.2 K/UL (ref 0–0.7)
EOSINOPHIL NFR BLD: 2.2 % (ref 0–4)
ERYTHROCYTE [DISTWIDTH] IN BLOOD BY AUTOMATED COUNT: 14.2 % (ref 11.5–14.5)
GFR NON-AFRICAN AMERICAN: > 60
GLUCOSE UR STRIP-MCNC: (no result) MG/DL
HGB BLD-MCNC: 13.3 G/DL (ref 12–16)
LEUKOCYTE ESTERASE UR-ACNC: (no result) LEU/UL
LYMPHOCYTES # BLD AUTO: 2.3 K/UL (ref 1–4.3)
LYMPHOCYTES NFR BLD AUTO: 32.9 % (ref 20–40)
MCH RBC QN AUTO: 29 PG (ref 27–31)
MCHC RBC AUTO-ENTMCNC: 33.6 G/DL (ref 33–37)
MCV RBC AUTO: 86.2 FL (ref 81–99)
MONOCYTES # BLD: 0.6 K/UL (ref 0–0.8)
MONOCYTES NFR BLD: 7.9 % (ref 0–10)
NEUTROPHILS # BLD: 4 K/UL (ref 1.8–7)
NEUTROPHILS NFR BLD AUTO: 56.3 % (ref 50–75)
NRBC BLD AUTO-RTO: 0 % (ref 0–0)
PH UR STRIP: 7 [PH] (ref 5–8)
PLATELET # BLD: 244 K/UL (ref 130–400)
PMV BLD AUTO: 8.5 FL (ref 7.2–11.7)
PROT UR STRIP-MCNC: 100 MG/DL
RBC # BLD AUTO: 4.59 MIL/UL (ref 3.8–5.2)
RBC # UR STRIP: (no result) /UL
SP GR UR STRIP: 1.02 (ref 1–1.03)
SQUAMOUS EPITHIAL: 2 /HPF (ref 0–5)
URINE CLARITY: (no result)
UROBILINOGEN UR-MCNC: 2 MG/DL (ref 0.2–1)
WBC # BLD AUTO: 7.1 K/UL (ref 4.8–10.8)

## 2018-08-09 PROCEDURE — 87181 SC STD AGAR DILUTION PER AGT: CPT

## 2018-08-09 PROCEDURE — 74176 CT ABD & PELVIS W/O CONTRAST: CPT

## 2018-08-09 PROCEDURE — 99284 EMERGENCY DEPT VISIT MOD MDM: CPT

## 2018-08-09 PROCEDURE — 87086 URINE CULTURE/COLONY COUNT: CPT

## 2018-08-09 PROCEDURE — 85025 COMPLETE CBC W/AUTO DIFF WBC: CPT

## 2018-08-09 PROCEDURE — 81025 URINE PREGNANCY TEST: CPT

## 2018-08-09 PROCEDURE — 80053 COMPREHEN METABOLIC PANEL: CPT

## 2018-08-09 PROCEDURE — 81003 URINALYSIS AUTO W/O SCOPE: CPT

## 2018-08-09 PROCEDURE — 76830 TRANSVAGINAL US NON-OB: CPT

## 2018-08-09 NOTE — CT
Date of service: 



08/09/2018



PROCEDURE:  CT Abdomen and Pelvis without intravenous contrast



HISTORY:

flank pain



COMPARISON:

8/2/2014



TECHNIQUE:

Without contrast.. 



Contrast dose: 0



Radiation dose:



Total exam DLP = 826.45 mGy-cm.



This CT exam was performed using one or more of the following dose 

reduction techniques: Automated exposure control, adjustment of the 

mA and/or kV according to patient size, and/or use of iterative 

reconstruction technique.



FINDINGS:



LOWER THORAX:

Unremarkable. 



LIVER:

Unremarkable. No gross lesion or ductal dilatation.  



GALLBLADDER AND BILE DUCTS:

Unremarkable. 



PANCREAS:

Unremarkable. No gross lesion or ductal dilatation.



SPLEEN:

Unremarkable. 



ADRENALS:

Unremarkable. No mass. 



KIDNEYS AND URETERS:

Unremarkable. No hydronephrosis. No solid mass. No renal or ureteral 

calculus. 



VASCULATURE:

Unremarkable. No aortic aneurysm. 



BOWEL:

Unremarkable. No obstruction. No gross mural thickening. 



APPENDIX:

Unremarkable. Normal appendix. 



PERITONEUM:

Unremarkable. No free fluid. No free air. 



LYMPH NODES:

No retroperitoneal or pelvic lymphadenopathy.  Shotty subcentimeter 

lymph nodes are seen throughout the small bowel mesenteric as well as 

medial to the cecum and ascending colon, consistent with nonspecific 

mesenteric adenitis. 



BLADDER:

Decompressed 



REPRODUCTIVE:

Uterus significant for peripherally calcified fibroid, 2.9 cm 

diameter. Probable additional isodense posterior subserosal fibroid 

not clearly delineated on the basis of this examination. 



BONES:

Grade 1 anterolisthesis at L5-S1 with bilateral L5 spondylolysis. 



OTHER FINDINGS:

None.



IMPRESSION:

No evidence of urinary calculus or urinary tract obstruction. 

Findings consistent with nonspecific mesenteric adenitis. Calcified 

uterine fibroid. No other significant abnormality.

## 2018-08-09 NOTE — ED PDOC
- Laboratory Results


Result Diagrams: 


 18 17:45





 18 17:45





- ECG


O2 Sat by Pulse Oximetry: 98 (RA)


Pulse Ox Interpretation: Normal





Medical Decision Making


Medical Decision Makin


Patient signed out to me by Dr. Holbrook pending US study.








US Transvaginal FINDINGS:


Uterus/cervix: Anterior fundal hypoechoic partially calcified mass measures 5.1 

cm, likely a fibroid.


Mild endometrial thickening measuring 0.6 cm.


Ovaries: Neither ovary is visualized.


Free fluid: No free fluid.


IMPRESSION:


1. Mild endometrial thickening measuring 0.6 cm. Cannot exclude polyp or other 

endometrial lesion.


2. Anterior fundal hypoechoic partially calcified mass measures 5.1 cm, likely 

a fibroid.


3 Neither ovary is visualized.








On reassessment, patient is in no acute distress and is stable for discharge 

home. Labs and imaging results discussed with patient and instructed to follow 

up with PMD in 2-3 days.





Diagnosis: Fibroid, UTI





Scribe Attestation:


Documented by Meche Velazquez, acting as a scribe for Shailesh Cosme MD.





Provider Scribe Attestation:


All medical record entries made by the Scribe were at my direction and 

personally dictated by me. I have reviewed the chart and agree that the record 

accurately reflects my personal performance of the history, physical exam, 

medical decision making, and the department course for this patient. I have 

also personally directed, reviewed, and agree with the discharge instructions 

and disposition.








Disposition





- Clinical Impression


Clinical Impression: 


 UTI (urinary tract infection), Fibroids








- POA


Present On Arrival: None





- Disposition


Disposition: Routine/Home


Disposition Time: 21:15


Condition: STABLE


Prescriptions: 


Ciprofloxacin [Cipro] 500 mg PO Q12 #14 tab


Instructions:  Urinary Tract Infections in Adults, Uterine Fibroids


Forms:  [employer] (English)

## 2018-08-09 NOTE — ED PDOC
HPI: Female  Pain


Time Seen by Provider: 08/09/18 16:25


Chief Complaint (Nursing): Female Genitourinary


Chief Complaint (Provider): Female Genitourinary


History Per: Patient


History/Exam Limitations: no limitations


Onset/Duration Of Symptoms: Intermittent Episodes (x2 months), Worse Since (x1 

day)


Current Symptoms Are (Timing): Still Present


Additional Complaint(s): 


53 year old female arrives to ED for an evaluation after she saw blood when 

wiping after urinating this morning. She reports associated dysuria, lower back 

pain and tactile fever ongoing for the last 2 months. Patient was seen recently 

in ED 2 weeks ago and discharged with antibiotics which improved symptoms but 

back pain persisted. She denies any nausea, vomiting, diarrhea or abdominal 

pain. 





PMD: Dr. Jen Roberts





Past Medical History


Reviewed: Historical Data, Nursing Documentation, Vital Signs


Vital Signs: 





 Last Vital Signs











Temp  99 F   08/09/18 16:10


 


Pulse  79   08/09/18 16:10


 


Resp  18   08/09/18 16:10


 


BP  140/78   08/09/18 16:10


 


Pulse Ox  98   08/09/18 16:10














- Medical History


PMH: Anxiety, Bronchitis, Diabetes, HTN, Hypercholesterolemia, Hyperlipidemia, 

Hypothyroidism, Pulmonary Embolism


   Denies: Hepatitis, HIV, Chronic Kidney Disease, Seizures, Sexually 

Transmitted Disease





- Surgical History


Other surgeries: tubal ligation





- Family History


Family History: States: Unknown Family Hx





- Social History


Current smoker - smoking cessation education provided: No


Ex-Smoker (has not smoked in the last 12 months): Yes (x9 years)


Alcohol: None


Drugs: Denies





- Immunization History


Hx Tetanus Toxoid Vaccination: No


Hx Influenza Vaccination: No


Hx Pneumococcal Vaccination: No





- Home Medications


Home Medications: 


 Ambulatory Orders











 Medication  Instructions  Recorded


 


Glimepiride [Amaryl] 4 mg PO DAILY 01/03/16


 


Metformin HCl 1,000 mg PO BID 01/03/16


 


Simvastatin 20 mg PO DAILY 01/25/16


 


Levothyroxine [Synthroid] 75 mcg PO DAILY 11/16/17


 


Sertraline [Zoloft] 50 mg PO DAILY #30 tab 11/17/17


 


Oseltamivir Phosphate [Tamiflu] 75 mg PO BID 5 Days #10 capsule 02/02/18


 


Famotidine [Pepcid] 20 mg PO Q12 #20 tab 07/30/18


 


Ondansetron [Zofran] 4 mg PO Q8H #10 tab 07/30/18


 


Ciprofloxacin [Cipro] 500 mg PO Q12 #14 tab 08/09/18














- Allergies


Allergies/Adverse Reactions: 


 Allergies











Allergy/AdvReac Type Severity Reaction Status Date / Time


 


Iodine and Iodide Containing Allergy  RASH Verified 08/09/18 16:13





Produc     


 


ACE Inhibitors AdvReac  Hyperkalemi Verified 08/09/18 16:13





   a  














Review of Systems


ROS Statement: Except As Marked, All Systems Reviewed And Found Negative


Constitutional: Positive for: Fever (tactile)


Gastrointestinal: Negative for: Nausea, Vomiting, Abdominal Pain, Diarrhea


Genitourinary Female: Positive for: Dysuria, Vaginal Bleeding


Musculoskeletal: Positive for: Back Pain (lower)





Physical Exam





- Reviewed


Nursing Documentation Reviewed: Yes


Vital Signs Reviewed: Yes





- Physical Exam


Appears: Positive for: Well, Non-toxic, No Acute Distress


Head Exam: Positive for: ATRAUMATIC, NORMAL INSPECTION, NORMOCEPHALIC


Skin: Positive for: Normal Color


Eye Exam: Positive for: Normal appearance


ENT: Positive for: Normal ENT Inspection


Neck: Positive for: Normal


Cardiovascular/Chest: Positive for: Regular Rate, Rhythm


Respiratory: Positive for: Normal Breath Sounds.  Negative for: Respiratory 

Distress


Gastrointestinal/Abdominal: Positive for: Soft, Tenderness (LLQ)


Pelvic Exam: Positive for: External Exam Normal, Speculum Exam Normal, Other (

nurse was chaperone darlene ledezma).  Negative for: Active Bleeding, Blood, 

Discharge


Back: Positive for: L CVA Tenderness, R CVA Tenderness


Extremity: Positive for: Normal ROM (upper/lower)


Neurologic/Psych: Positive for: Alert (x3), Oriented


Comments: 





MARIA Ledezma as chaperone for  exam.





- Laboratory Results


Result Diagrams: 


 08/09/18 17:45





 08/09/18 17:45





- ECG


O2 Sat by Pulse Oximetry: 98 (RA)


Pulse Ox Interpretation: Normal





Medical Decision Making


Medical Decision Making: 


Initial Impression: Dysuria rule out stone/pyelonephritis, uti, electrolyte 

abnormality





Initial Plan:


* CT ABD/pelvis without contrast


* CMP


* CBC


* NS 1,000ml IV per 999mls/hr


* Toradol 30mg IV


* Urine C&S


* UA





1820


CT Abdomen/Pelvis FINDINGS:





LOWER THORAX:


Unremarkable. 





LIVER:


Unremarkable. No gross lesion or ductal dilatation.  





GALLBLADDER AND BILE DUCTS:


Unremarkable. 





PANCREAS:


Unremarkable. No gross lesion or ductal dilatation.





SPLEEN:


Unremarkable. 





ADRENALS:


Unremarkable. No mass. 





KIDNEYS AND URETERS:


Unremarkable. No hydronephrosis. No solid mass. No renal or ureteral calculus. 





VASCULATURE:


Unremarkable. No aortic aneurysm. 





BOWEL:


Unremarkable. No obstruction. No gross mural thickening. 





APPENDIX:


Unremarkable. Normal appendix. 





PERITONEUM:


Unremarkable. No free fluid. No free air. 





LYMPH NODES:


No retroperitoneal or pelvic lymphadenopathy.  Shotty subcentimeter lymph nodes 

are seen throughout the small bowel mesenteric as well as medial to the cecum 

and ascending colon, consistent with nonspecific mesenteric adenitis. 





BLADDER:


Decompressed 





REPRODUCTIVE:


Uterus significant for peripherally calcified fibroid, 2.9 cm diameter. 

Probable additional isodense posterior subserosal fibroid not clearly 

delineated on the basis of this examination. 





BONES:


Grade 1 anterolisthesis at L5-S1 with bilateral L5 spondylolysis. 





OTHER FINDINGS:


None.





IMPRESSION:


No evidence of urinary calculus or urinary tract obstruction. Findings 

consistent with nonspecific mesenteric adenitis. Calcified uterine fibroid. No 

other significant abnormality.








1824


Pelvic exam unremarkable so US transvaginal ordered.


UA reviewed, patient with mild UTI. Cippro 500mg PO given.





1900


Patient signed out to Dr. Cosme pending US study and reassessment.


pt will need to go home on po abx





--------------------------------------------------------------------------------

-----------------


Scribe Attestation:


Documented by Day Wadsworth, acting as a scribe for Thong Holbrook MD.





Provider Scribe Attestation:


All medical record entries made by the Scribe were at my direction and 

personally dictated by me. I have reviewed the chart and agree that the record 

accurately reflects my personal performance of the history, physical exam, 

medical decision making, and the department course for this patient. I have 

also personally directed, reviewed, and agree with the discharge instructions 

and disposition.





Disposition





- Clinical Impression


Clinical Impression: 


 UTI (urinary tract infection), Fibroids








- Patient ED Disposition


Is Patient to be Admitted: Transfer of Care


Counseled Patient/Family Regarding: Studies Performed, Diagnosis





- Disposition


Disposition: Transfer of Care


Disposition Time: 19:00


Condition: IMPROVED


Prescriptions: 


Ciprofloxacin [Cipro] 500 mg PO Q12 #14 tab


Instructions:  Urinary Tract Infections in Adults, Uterine Fibroids


Forms:  [employer] [facility] (English)


Patient Signed Over To: Shailesh Cosme

## 2018-08-10 NOTE — US
Date of service: 



08/09/2018



HISTORY:

vaginal bleeding post menopausal



COMPARISON:

01/04/2010.



TECHNIQUE:

Transvaginal only. Real -time technique with 2D, duplex and color 

Doppler



FINDINGS:



UTERUS:

Measures 4.7 x 5.8 x 8.3 cm. Normal in size, heterogeneous echo 

characteristics.  Location of fibroid and size: Midline anterior sub 

serosal 3.7 x 4 x 5.1.  On the prior study this measured 2.1 x 2 x 

2.7 cm 



ENDOMETRIUM:

Measures 6.2 mm in diameter. Unremarkable. 



CERVIX:

No cervical abnormality identified.



RIGHT OVARY:

Not visible



LEFT OVARY:

Not visible.



FREE FLUID:

No significant free fluid noted.



OTHER FINDINGS:

None. 



IMPRESSION:

Enlarged solitary fibroid compared to the prior study.



Limitations of the current examination: Nonvisualization of the 

adnexa.



________________________________________________



Concordant results (preliminary interpretation) provided by [facility].



Procedure Completed: 19:12.



Preliminary ([facility]) Report: Dictated and Authenticated: 20:45



Final Interpretation: 10:21. August 10, 2018.

## 2018-08-12 VITALS — OXYGEN SATURATION: 98 %

## 2018-09-06 ENCOUNTER — HOSPITAL ENCOUNTER (EMERGENCY)
Dept: HOSPITAL 14 - H.ER | Age: 53
Discharge: HOME | End: 2018-09-06
Payer: COMMERCIAL

## 2018-09-06 VITALS
SYSTOLIC BLOOD PRESSURE: 140 MMHG | HEART RATE: 72 BPM | RESPIRATION RATE: 16 BRPM | DIASTOLIC BLOOD PRESSURE: 56 MMHG | TEMPERATURE: 98.1 F

## 2018-09-06 VITALS — BODY MASS INDEX: 29.3 KG/M2

## 2018-09-06 VITALS — OXYGEN SATURATION: 100 %

## 2018-09-06 DIAGNOSIS — E11.9: ICD-10-CM

## 2018-09-06 DIAGNOSIS — N76.0: Primary | ICD-10-CM

## 2018-09-06 DIAGNOSIS — E03.9: ICD-10-CM

## 2018-09-06 DIAGNOSIS — Z79.84: ICD-10-CM

## 2018-09-06 DIAGNOSIS — E78.00: ICD-10-CM

## 2018-09-06 LAB
BACTERIA #/AREA URNS HPF: (no result) /[HPF]
BILIRUB UR-MCNC: NEGATIVE MG/DL
COLOR UR: YELLOW
GLUCOSE UR STRIP-MCNC: (no result) MG/DL
LEUKOCYTE ESTERASE UR-ACNC: (no result) LEU/UL
PH UR STRIP: 6 [PH] (ref 5–8)
PROT UR STRIP-MCNC: NEGATIVE MG/DL
RBC # UR STRIP: NEGATIVE /UL
SP GR UR STRIP: 1.01 (ref 1–1.03)
SQUAMOUS EPITHIAL: 1 /HPF (ref 0–5)
URINE CLARITY: CLEAR
UROBILINOGEN UR-MCNC: (no result) MG/DL (ref 0.2–1)

## 2018-09-06 NOTE — ED PDOC
HPI: Abdomen


Time Seen by Provider: 09/06/18 16:35


Chief Complaint (Nursing): Abdominal Pain


Chief Complaint (Provider): Lower abdominal pain


History Per: Patient


History/Exam Limitations: no limitations


Additional History Per: Patient


Additional Complaint(s): 


52yo female, comes to ER with complaints of lower abdominal pain per triage 

however patient states her main complaint is vaginal irritation. She reports 

similar symptoms a couple weeks ago and was treated with antibiotics with relief

; patient reports since she is diabetic, she might not have gotten enough 

antibiotics. Patient denies any vaginal bleeding or discharge. She also denies 

being sexually active. 


LMP: 20 years ago





Past Medical History


Reviewed: Historical Data, Nursing Documentation, Vital Signs


Vital Signs: 


 Last Vital Signs











Temp  98.1 F   09/06/18 18:41


 


Pulse  72   09/06/18 18:41


 


Resp  16   09/06/18 18:41


 


BP  140/56 L  09/06/18 18:41


 


Pulse Ox  100   09/06/18 18:51














- Medical History


PMH: Anxiety, Bronchitis, Diabetes, HTN, Hypercholesterolemia, Hyperlipidemia, 

Hypothyroidism, Pulmonary Embolism


   Denies: Hepatitis, HIV, Chronic Kidney Disease, Seizures, Sexually 

Transmitted Disease





- Surgical History


Surgical History: No Surg Hx





- Family History


Family History: States: No Known Family Hx





- Immunization History


Hx Tetanus Toxoid Vaccination: No


Hx Influenza Vaccination: No


Hx Pneumococcal Vaccination: No





- Home Medications


Home Medications: 


 Ambulatory Orders











 Medication  Instructions  Recorded


 


Glimepiride [Amaryl] 4 mg PO DAILY 01/03/16


 


Metformin HCl 1,000 mg PO BID 01/03/16


 


Simvastatin 20 mg PO DAILY 01/25/16


 


Levothyroxine [Synthroid] 75 mcg PO DAILY 11/16/17


 


Sertraline [Zoloft] 50 mg PO DAILY #30 tab 11/17/17


 


Oseltamivir Phosphate [Tamiflu] 75 mg PO BID 5 Days #10 capsule 02/02/18


 


Famotidine [Pepcid] 20 mg PO Q12 #20 tab 07/30/18


 


Ondansetron [Zofran] 4 mg PO Q8H #10 tab 07/30/18


 


Ciprofloxacin [Cipro] 500 mg PO Q12 #14 tab 08/09/18


 


Clindamycin [Cleocin] 300 mg PO BID #28 cap 09/06/18














- Allergies


Allergies/Adverse Reactions: 


 Allergies











Allergy/AdvReac Type Severity Reaction Status Date / Time


 


Iodine and Iodide Containing Allergy  RASH Verified 09/06/18 16:10





Produc     


 


ACE Inhibitors AdvReac  Hyperkalemi Verified 09/06/18 16:10





   a  














Review of Systems


ROS Statement: Except As Marked, All Systems Reviewed And Found Negative


Genitourinary Female: Positive for: Other (vaginal irritation).  Negative for: 

Vaginal Discharge, Vaginal Bleeding





Physical Exam





- Reviewed


Nursing Documentation Reviewed: Yes


Vital Signs Reviewed: Yes





- Physical Exam


Appears: Positive for: Non-toxic, No Acute Distress


Head Exam: Positive for: ATRAUMATIC, NORMAL INSPECTION, NORMOCEPHALIC


Skin: Positive for: Normal Color


Neck: Positive for: Supple


Cardiovascular/Chest: Positive for: Regular Rate, Rhythm


Respiratory: Positive for: Normal Breath Sounds


Gastrointestinal/Abdominal: Positive for: Normal Exam, Soft.  Negative for: 

Tenderness


Pelvic Exam: Positive for: External Exam Normal, Discharge (scant yellow 

discharge no leeding.), Other (closed cervix. no pelvic abnormalities.)


Back: Positive for: Normal Inspection.  Negative for: L CVA Tenderness, R CVA 

Tenderness


Extremity: Positive for: Normal ROM


Neurologic/Psych: Positive for: Alert, Oriented





- ECG


O2 Sat by Pulse Oximetry: 100 (RA)


Pulse Ox Interpretation: Normal





Medical Decision Making


Medical Decision Making: 


A&P: Vaginal irritation





Genital culture and will determine treatment pending pelvic exam.


Patient does not show sings of infection; urinalysis sent.





1818


Pelvic exam with scant yellow discharge. See physical exam for further findings.


Patient has a follow up appointment with GYN tomorrow and instructed to follow 

up as scheduled.


Stable for d/c home.





--------------------------------------------------------------------------------

-----------------


Scribe Attestation:


Documented by Meche Velazquez, acting as a scribe for Carmita Elizondo MD





Provider Scribe Attestation:


All medical record entries made by the Scribe were at my direction and 

personally dictated by me. I have reviewed the chart and agree that the record 

accurately reflects my personal performance of the history, physical exam, 

medical decision making, and the department course for this patient. I have 

also personally directed, reviewed, and agree with the discharge instructions 

and disposition.





Disposition





- Clinical Impression


Clinical Impression: 


 Bacterial vaginitis








- Disposition


Disposition: Routine/Home


Disposition Time: 18:18


Condition: STABLE


Additional Instructions: 


Follow up with gynecologist as scheduled  for tomorrow.  Take antibiotics as 

prescribed.  Return to the emergency department if pain worsens or if you 

develop fever, nausea/vomiting, or other new symptoms.


Prescriptions: 


Clindamycin [Cleocin] 300 mg PO BID #28 cap


Instructions:  Bacterial Vaginosis (DC)


Forms:  [facility] (English)

## 2018-10-15 ENCOUNTER — HOSPITAL ENCOUNTER (EMERGENCY)
Dept: HOSPITAL 14 - H.ER | Age: 53
Discharge: HOME | End: 2018-10-15
Payer: COMMERCIAL

## 2018-10-15 VITALS — HEART RATE: 70 BPM | TEMPERATURE: 99.2 F | SYSTOLIC BLOOD PRESSURE: 157 MMHG | DIASTOLIC BLOOD PRESSURE: 83 MMHG

## 2018-10-15 VITALS — RESPIRATION RATE: 18 BRPM

## 2018-10-15 VITALS — BODY MASS INDEX: 29.3 KG/M2

## 2018-10-15 DIAGNOSIS — Z86.711: ICD-10-CM

## 2018-10-15 DIAGNOSIS — R63.0: Primary | ICD-10-CM

## 2018-10-15 DIAGNOSIS — Z79.84: ICD-10-CM

## 2018-10-15 DIAGNOSIS — E78.00: ICD-10-CM

## 2018-10-15 DIAGNOSIS — E03.9: ICD-10-CM

## 2018-10-15 DIAGNOSIS — I10: ICD-10-CM

## 2018-10-15 DIAGNOSIS — E11.9: ICD-10-CM

## 2018-10-15 LAB
ALBUMIN SERPL-MCNC: 4.1 G/DL (ref 3.5–5)
ALBUMIN/GLOB SERPL: 1.2 {RATIO} (ref 1–2.1)
ALT SERPL-CCNC: 24 U/L (ref 9–52)
AST SERPL-CCNC: 32 U/L (ref 14–36)
BASOPHILS # BLD AUTO: 0 K/UL (ref 0–0.2)
BASOPHILS NFR BLD: 0.3 % (ref 0–2)
BUN SERPL-MCNC: 18 MG/DL (ref 7–17)
CALCIUM SERPL-MCNC: 9.7 MG/DL (ref 8.4–10.2)
EOSINOPHIL # BLD AUTO: 0.2 K/UL (ref 0–0.7)
EOSINOPHIL NFR BLD: 2.3 % (ref 0–4)
ERYTHROCYTE [DISTWIDTH] IN BLOOD BY AUTOMATED COUNT: 13.7 % (ref 11.5–14.5)
GFR NON-AFRICAN AMERICAN: > 60
HGB BLD-MCNC: 14 G/DL (ref 12–16)
LYMPHOCYTES # BLD AUTO: 1.8 K/UL (ref 1–4.3)
LYMPHOCYTES NFR BLD AUTO: 26.8 % (ref 20–40)
MCH RBC QN AUTO: 29.1 PG (ref 27–31)
MCHC RBC AUTO-ENTMCNC: 33.9 G/DL (ref 33–37)
MCV RBC AUTO: 85.8 FL (ref 81–99)
MONOCYTES # BLD: 0.5 K/UL (ref 0–0.8)
MONOCYTES NFR BLD: 8 % (ref 0–10)
NEUTROPHILS # BLD: 4.3 K/UL (ref 1.8–7)
NEUTROPHILS NFR BLD AUTO: 62.6 % (ref 50–75)
NRBC BLD AUTO-RTO: 0.1 % (ref 0–0)
PLATELET # BLD: 249 K/UL (ref 130–400)
PMV BLD AUTO: 8.6 FL (ref 7.2–11.7)
RBC # BLD AUTO: 4.81 MIL/UL (ref 3.8–5.2)
T3: 0.88 NMOL/L (ref 1.49–2.6)
T4 SERPL-MCNC: 6.79 UG/DL (ref 5.5–11)
WBC # BLD AUTO: 6.8 K/UL (ref 4.8–10.8)

## 2018-10-15 NOTE — ED PDOC
HPI: General Adult


Time Seen by Provider: 10/15/18 13:39


Chief Complaint (Nursing): Anxiety


Chief Complaint (Provider): Loss of Appetite


History Per: Patient


History/Exam Limitations: no limitations


Onset/Duration Of Symptoms: Days


Additional Complaint(s): 


54 y/o female presents to the ED complaining of loss of appetite. Patient states

she lost insurance and is unable to follow up with her PMD. Patient reports of 

noticing within a couple of hours after eating, she begins to feel very shaky. 

Patient reports she has not checked her blood sugar. Patient additionally 

reports of being less hungry and only eats when she thinks she needs to. In 

addition, patient thinks that her thyroid is "not working" and that be the 

reason for the shakiness. Denies nausea, vomiting, diarrhea, abdominal pain, 

weight gain or weight loss. 





PMD: Dr. Hawkins at the [facility]





Past Medical History


Reviewed: Historical Data, Nursing Documentation, Vital Signs


Vital Signs: 





                                Last Vital Signs











Temp  97.9 F   10/15/18 12:12


 


Pulse  83   10/15/18 12:12


 


Resp  18   10/15/18 12:12


 


BP  137/77   10/15/18 12:12


 


Pulse Ox  98   10/15/18 12:12














- Medical History


PMH: Anxiety, Bronchitis, Diabetes, HTN, Hypercholesterolemia, Hyperlipidemia, 

Hypothyroidism, Pulmonary Embolism


   Denies: Hepatitis, HIV, Chronic Kidney Disease, Seizures, Sexually 

Transmitted Disease





- Surgical History


Surgical History: No Surg Hx





- Family History


Family History: States: Unknown Family Hx





- Immunization History


Hx Tetanus Toxoid Vaccination: No


Hx Influenza Vaccination: No


Hx Pneumococcal Vaccination: No





- Home Medications


Home Medications: 


                                Ambulatory Orders











 Medication  Instructions  Recorded


 


RX: Glimepiride [Amaryl] 4 mg PO DAILY 01/03/16


 


RX: Metformin HCl 1,000 mg PO BID 01/03/16


 


RX: Simvastatin 20 mg PO DAILY 01/25/16


 


RX: Levothyroxine [Synthroid] 75 mcg PO DAILY 11/16/17


 


Sertraline [Zoloft] 50 mg PO DAILY #30 tab 11/17/17


 


Oseltamivir Phosphate [Tamiflu] 75 mg PO BID 5 Days #10 capsule 02/02/18


 


Famotidine [Pepcid] 20 mg PO Q12 #20 tab 07/30/18


 


Ondansetron [Zofran] 4 mg PO Q8H #10 tab 07/30/18


 


Ciprofloxacin [Cipro] 500 mg PO Q12 #14 tab 08/09/18


 


RX: Clindamycin [Cleocin] 300 mg PO BID #28 cap 09/06/18














- Allergies


Allergies/Adverse Reactions: 


                                    Allergies











Allergy/AdvReac Type Severity Reaction Status Date / Time


 


Iodine and Iodide Containing Allergy  RASH Verified 10/15/18 12:11





Produc     


 


ACE Inhibitors AdvReac  Hyperkalemi Verified 10/15/18 12:11





   a  














Review of Systems


ROS Statement: Except As Marked, All Systems Reviewed And Found Negative


Constitutional: Positive for: Other ("shaky").  Negative for: Weight loss (or 

gain)


Gastrointestinal: Negative for: Nausea, Vomiting, Abdominal Pain, Diarrhea


Genitourinary Female: Positive for: Other (Loss of appetite)





Physical Exam





- Reviewed


Nursing Documentation Reviewed: Yes


Vital Signs Reviewed: Yes





- Physical Exam


Appears: Positive for: No Acute Distress


Head Exam: Positive for: ATRAUMATIC, NORMOCEPHALIC


Skin: Positive for: Normal Color, Warm, Dry


Eye Exam: Positive for: Normal appearance, EOMI, PERRL


Neck: Positive for: Normal, Painless ROM


Cardiovascular/Chest: Positive for: Regular Rate, Rhythm.  Negative for: Murmur


Respiratory: Positive for: Normal Breath Sounds.  Negative for: Respiratory 

Distress


Gastrointestinal/Abdominal: Positive for: Normal Exam, Soft.  Negative for: 

Tenderness


Back: Positive for: Normal Inspection.  Negative for: L CVA Tenderness, R CVA 

Tenderness, Vertebral Tenderness


Extremity: Positive for: Normal ROM.  Negative for: Pedal Edema, Deformity


Neurologic/Psych: Positive for: Alert, Oriented.  Negative for: Motor/Sensory 

Deficits





- Laboratory Results


Result Diagrams: 


                                 10/15/18 14:50





                                 10/15/18 14:50





- ECG


O2 Sat by Pulse Oximetry: 98 (RA)


Pulse Ox Interpretation: Normal





Medical Decision Making


Medical Decision Making: 


Time: 1450


A/P: Workup for hypo hypoglycemia vs thyroid abnormality


-- Labs including A1C and Thyroid Panel 


-- Reassess patient





-- CMP


-- Hemoglobin A1C


-- T3 


-- T4


-- TSH 


-- CBC with Differentials


-- Glucose, POC  


________________








1900


Labs WNL.





Pt will follow up with PMD for continued care of long term medications.  NO 

indication for admission or change in medications.  Return parameters discussed 

with the patient.





_____________________________________________________


Scribe Attestation:


Documented by Regan Vitale, acting as a scribe for Anupama Elizondo MD.





Provider Scribe Attestation:


All medical record entries made by the Scribe were at my direction and 

personally dictated by me. I have reviewed the chart and agree that the record 

accurately reflects my personal performance of the history, physical exam, 

medical decision making, and the department course for this patient. I have also

 personally directed, reviewed, and agree with the discharge instructions and 

disposition.





Disposition





- Clinical Impression


Clinical Impression: 


 Diabetes mellitus type 2, controlled, without complications, Loss of appetite








- Disposition


Disposition Time: 19:00


Condition: IMPROVED


Additional Instructions: 


Today your labs showed controlled diabetes and normal thyroid function levels.  

Your labs are otherwise normal.  Follow up with primary medical doctor for 

further medication adjustment.  Return to the emergency department if symptoms 

worsen or if new symptoms develop. 


Forms:  [facility] (English)


Print Language: ENGLISH

## 2018-10-19 VITALS — OXYGEN SATURATION: 98 %

## 2019-04-08 ENCOUNTER — HOSPITAL ENCOUNTER (EMERGENCY)
Dept: HOSPITAL 14 - H.ER | Age: 54
Discharge: HOME | End: 2019-04-08
Payer: SELF-PAY

## 2019-04-08 VITALS — RESPIRATION RATE: 20 BRPM | DIASTOLIC BLOOD PRESSURE: 70 MMHG | HEART RATE: 72 BPM | SYSTOLIC BLOOD PRESSURE: 120 MMHG

## 2019-04-08 VITALS — BODY MASS INDEX: 28.8 KG/M2

## 2019-04-08 VITALS — TEMPERATURE: 98.6 F

## 2019-04-08 DIAGNOSIS — Z86.711: ICD-10-CM

## 2019-04-08 DIAGNOSIS — E78.00: ICD-10-CM

## 2019-04-08 DIAGNOSIS — F41.9: ICD-10-CM

## 2019-04-08 DIAGNOSIS — I10: ICD-10-CM

## 2019-04-08 DIAGNOSIS — E03.9: ICD-10-CM

## 2019-04-08 DIAGNOSIS — E11.9: ICD-10-CM

## 2019-04-08 DIAGNOSIS — R42: Primary | ICD-10-CM

## 2019-04-08 DIAGNOSIS — Z88.8: ICD-10-CM

## 2019-04-08 DIAGNOSIS — Z79.84: ICD-10-CM

## 2019-04-08 LAB
ALBUMIN SERPL-MCNC: 4.6 G/DL (ref 3.5–5)
ALBUMIN/GLOB SERPL: 1.3 {RATIO} (ref 1–2.1)
ALT SERPL-CCNC: 26 U/L (ref 9–52)
APTT BLD: 32.9 SECONDS (ref 25.6–37.1)
AST SERPL-CCNC: 33 U/L (ref 14–36)
BACTERIA #/AREA URNS HPF: (no result) /[HPF]
BASOPHILS # BLD AUTO: 0 K/UL (ref 0–0.2)
BASOPHILS NFR BLD: 0.4 % (ref 0–2)
BILIRUB UR-MCNC: NEGATIVE MG/DL
BUN SERPL-MCNC: 16 MG/DL (ref 7–17)
CALCIUM SERPL-MCNC: 10.1 MG/DL (ref 8.4–10.2)
COLOR UR: (no result)
EOSINOPHIL # BLD AUTO: 0.1 K/UL (ref 0–0.7)
EOSINOPHIL NFR BLD: 2 % (ref 0–4)
ERYTHROCYTE [DISTWIDTH] IN BLOOD BY AUTOMATED COUNT: 13.8 % (ref 11.5–14.5)
GFR NON-AFRICAN AMERICAN: > 60
GLUCOSE UR STRIP-MCNC: (no result) MG/DL
HGB BLD-MCNC: 14.5 G/DL (ref 12–16)
INR PPP: 1
LEUKOCYTE ESTERASE UR-ACNC: (no result) LEU/UL
LYMPHOCYTES # BLD AUTO: 1.2 K/UL (ref 1–4.3)
LYMPHOCYTES NFR BLD AUTO: 26.1 % (ref 20–40)
MCH RBC QN AUTO: 28.2 PG (ref 27–31)
MCHC RBC AUTO-ENTMCNC: 32.8 G/DL (ref 33–37)
MCV RBC AUTO: 86.1 FL (ref 81–99)
MONOCYTES # BLD: 0.4 K/UL (ref 0–0.8)
MONOCYTES NFR BLD: 8.2 % (ref 0–10)
NEUTROPHILS # BLD: 3 K/UL (ref 1.8–7)
NEUTROPHILS NFR BLD AUTO: 63.3 % (ref 50–75)
NRBC BLD AUTO-RTO: 0.1 % (ref 0–0)
PH UR STRIP: 7 [PH] (ref 5–8)
PLATELET # BLD: 212 K/UL (ref 130–400)
PMV BLD AUTO: 8.7 FL (ref 7.2–11.7)
PROT UR STRIP-MCNC: NEGATIVE MG/DL
PROTHROMBIN TIME: 11.1 SECONDS (ref 9.8–13.1)
RBC # BLD AUTO: 5.14 MIL/UL (ref 3.8–5.2)
RBC # UR STRIP: NEGATIVE /UL
SP GR UR STRIP: 1.01 (ref 1–1.03)
SQUAMOUS EPITHIAL: 2 /HPF (ref 0–5)
URINE CLARITY: (no result)
UROBILINOGEN UR-MCNC: (no result) MG/DL (ref 0.2–1)
WBC # BLD AUTO: 4.7 K/UL (ref 4.8–10.8)

## 2019-04-08 NOTE — RAD
Date of service: 



04/08/2019



HISTORY:

Lightheadedness 



COMPARISON:

05/10/2018.



TECHNIQUE:

Chest PA and lateral



FINDINGS:



LINES AND TUBES:

None. 



LUNG AND PLEURA:

The lungs are well inflated and clear. No pleural effusion or 

pneumothorax.



HEART AND MEDIASTINUM:

The heart is not enlarged.  No aortic atherosclerotic calcifications 

present.  The hilar and mediastinal contours are within normal limits.



SKELETAL STRUCTURES:

The bony structures are within normal limits for the patient's age.



VISUALIZED UPPER ABDOMEN:

Normal.



OTHER FINDINGS:

None.



IMPRESSION:

No active pulmonary disease.

## 2019-04-08 NOTE — CARD
--------------- APPROVED REPORT --------------





Date of service: 04/08/2019



EKG Measurement

Heart [id number]

[place] [id number]

[id number]

[id number]

[id number]



<Conclusion>

Normal sinus rhythm

Septal infarct, age undetermined

Abnormal ECG

## 2019-04-08 NOTE — ED PDOC
HPI: General Adult


Time Seen by Provider: 19 11:03


Chief Complaint (Nursing): Headache


Chief Complaint (Provider): Headache


History Per: Patient


History/Exam Limitations: no limitations


Onset/Duration Of Symptoms: Days (x 2 weeks)


Current Symptoms Are (Timing): Still Present


Additional Complaint(s): 


54 year old female with a history of hypothyroidism, HTN and PE presents to the 

ED for evaluation of intermittent lightheadedness for two weeks as well as a 

frontal headache for two months. Patient was concerned about her sugar. However,

her sugar was normal. Denies chest pain, shortness of breath, visual changes, 

paresthesias, and focal weakness. 





PMD: Dr. Merly Osborn





Past Medical History


Reviewed: Historical Data, Nursing Documentation, Vital Signs


Vital Signs: 





                                Last Vital Signs











Temp  98.6 F   19 10:38


 


Pulse  70   19 10:38


 


Resp  18   19 10:38


 


BP  140/74   19 10:38


 


Pulse Ox  99   19 10:38














- Medical History


PMH: Anxiety, Bronchitis, Diabetes, HTN, Hypercholesterolemia, Hyperlipidemia, 

Hypothyroidism, Pulmonary Embolism


   Denies: Hepatitis, HIV, Chronic Kidney Disease, Seizures, Sexually 

Transmitted Disease





- Surgical History


Other surgeries: tubal ligation and thyroidectomy





- Family History


Family History: States: Unknown Family Hx





- Immunization History


Hx Tetanus Toxoid Vaccination: No


Hx Influenza Vaccination: No


Hx Pneumococcal Vaccination: No





- Home Medications


Home Medications: 


                                Ambulatory Orders











 Medication  Instructions  Recorded


 


Glimepiride [Amaryl] 4 mg PO DAILY 16


 


Metformin HCl 1,000 mg PO BID 16


 


Simvastatin 20 mg PO DAILY 16


 


Levothyroxine [Synthroid] 75 mcg PO DAILY 17


 


Sertraline [Zoloft] 50 mg PO DAILY #30 tab 17


 


Oseltamivir Phosphate [Tamiflu] 75 mg PO BID 5 Days #10 capsule 18


 


Famotidine [Pepcid] 20 mg PO Q12 #20 tab 18


 


Ondansetron [Zofran] 4 mg PO Q8H #10 tab 18


 


Ciprofloxacin [Cipro] 500 mg PO Q12 #14 tab 18


 


Clindamycin [Cleocin] 300 mg PO BID #28 cap 18


 


Levothyroxine [Synthroid] 100 mcg PO DAILY #14 tab 19














- Allergies


Allergies/Adverse Reactions: 


                                    Allergies











Allergy/AdvReac Type Severity Reaction Status Date / Time


 


Iodine and Iodide Containing Allergy  RASH Verified 19 11:17





Produc     


 


ACE Inhibitors AdvReac  Hyperkalemi Verified 19 11:17





   a  














Review of Systems


ROS Statement: Except As Marked, All Systems Reviewed And Found Negative


Constitutional: Positive for: Other (lightheadedness)


Eyes: Negative for: Vision Change


Cardiovascular: Negative for: Chest Pain


Respiratory: Negative for: Shortness of Breath


Neurological: Positive for: Headache (frontal)





Physical Exam





- Reviewed


Nursing Documentation Reviewed: Yes


Vital Signs Reviewed: Yes





- Physical Exam


Appears: Positive for: No Acute Distress


Head Exam: Positive for: ATRAUMATIC, NORMAL INSPECTION, NORMOCEPHALIC


Skin: Positive for: Normal Color, Warm, Dry


Eye Exam: Positive for: EOMI, Normal appearance, PERRL


Neck: Positive for: Normal, Painless ROM, Supple


Cardiovascular/Chest: Positive for: Regular Rate, Rhythm.  Negative for: Murmur


Respiratory: Positive for: Normal Breath Sounds.  Negative for: Respiratory 

Distress


Gastrointestinal/Abdominal: Positive for: Normal Exam, Soft.  Negative for: 

Tenderness, Mass, Guarding


Back: Positive for: Normal Inspection.  Negative for: L CVA Tenderness, R CVA 

Tenderness


Extremity: Positive for: Normal ROM (x 4).  Negative for: Deformity


Neurological/Psych: Positive for: Awake, Alert, Normal Tone, Oriented (x 3).  

Negative for: Motor/Sensory Deficits





- Laboratory Results


Result Diagrams: 


                                 19 11:20





                                 19 11:20





- ECG


O2 Sat by Pulse Oximetry: 99 (RA)


Pulse Ox Interpretation: Normal





Medical Decision Making


Medical Decision Makin:18 


Impression: headache and lightheadedness


Initial Plan: 


--CT Head 


--CBC 


--CMP 


--CXR


--PTT 


--PT


--TSH 


--Urine dip 


--UA 


--Glucose POC 





Accession No. : [id number]


Patient Name / ID : LANIE SARGENT  / [id number]


Exam Date : 2019 11:45:18 ( Approved )


Study Comment : 


Sex / Age : F  / 54Y





Creator : 


Dictator : Alyse Sandhu MD


 : 


Approver : Alyse Sandhu MD


Approver2 : 





Report Date : 


My Comment : 


***************

********************************************************************





Date of service: 





2019





HISTORY:


Lightheadedness 





COMPARISON:


05/10/2018.





TECHNIQUE:


Chest PA and lateral





FINDINGS:





LINES AND TUBES:


None. 





LUNG AND PLEURA:


The lungs are well inflated and clear. No pleural effusion or pneumothorax.





HEART AND MEDIASTINUM:


The heart is not enlarged.  No aortic atherosclerotic calcifications present.  

The hilar and mediastinal contours are within normal limits.





SKELETAL STRUCTURES:


The bony structures are within normal limits for the patient's age.





VISUALIZED UPPER ABDOMEN:


Normal.





OTHER FINDINGS:


None.





IMPRESSION:


No active pulmonary disease.





Accession No. : [id number]


Patient Name / ID : LAINE SARGENT  / [id number]


Exam Date : 2019 13:45:16 ( Approved )


Study Comment : 


Sex / Age : F  / 54Y





Creator : Alyse Sandhu MD


Dictator : Alyse Sandhu MD


 : 


Approver : Alyse Sandhu MD


Approver2 : 





Report Date : 2019 13:54:02


My Comment : 


 

********************************************************************************


***





Date of service: 





2019





PROCEDURE:  CT HEAD WITHOUT CONTRAST.





HISTORY:


Frontal HA





COMPARISON:


None available.





TECHNIQUE:


Axial computed tomography images were obtained through the head/brain without 

intravenous contrast.  





Radiation dose:





Total exam DLP = 960.83 mGy-cm.





This CT exam was performed using one or more of the following dose reduction 

techniques: Automated exposure control, adjustment of the mA and/or kV according

 to patient size, and/or use of iterative reconstruction technique.





FINDINGS:





HEMORRHAGE:


No intracranial hemorrhage. 





BRAIN:


Gray-white matter differentiation is preserved.  There is no mass, mass effect 

or abnormal extra-axial fluid collection.  There is no territorial infarction. 

The midline sagittal structures are normal.





VENTRICLES:


The ventricles are normal in size, shape and configuration.





CALVARIUM:








There is no calvarial fracture or extracranial soft tissue swelling.





PARANASAL SINUSES:


Predominantly clear.





MASTOID AIR CELLS:


Predominantly clear.





OTHER FINDINGS:


None.





IMPRESSION:


No acute intracranial abnormality.





14:40


Case discussed with Dr. Avitia, recommends increasing dose of Synthroid to 100 mcg 

daily, follow-up with PMD. 





-----------------

--------------------------------------------------------------------------------


Scribe Attestation:


Documented by Bonny Frazier, acting as a scribe for Windy Conti MD 





Provider Scribe Attestation:


All medical record entries made by the Scribe were at my direction and 

personally dictated by me. I have reviewed the chart and agree that the record 

accurately reflects my personal performance of the history, physical exam, 

medical decision making, and the department course for this patient. I have also

 personally directed, reviewed, and agree with the discharge instructions and 

disposition





Disposition





- Clinical Impression


Clinical Impression: 


 Lightheadedness








- Disposition


Referrals: 


[facility] [Outside]


Disposition: Routine/Home


Disposition Time: 14:45


Condition: STABLE


Additional Instructions: 


INCREASE DOSE OF SYNTHROID  MCG DAILY.


Prescriptions: 


Levothyroxine [Synthroid] 100 mcg PO DAILY #14 tab


Instructions:  Dizziness, Nonvertigo, (DC)


Forms:  [facility] (English)

## 2019-04-08 NOTE — CT
Date of service: 



04/08/2019



PROCEDURE:  CT HEAD WITHOUT CONTRAST.



HISTORY:

Frontal HA



COMPARISON:

None available.



TECHNIQUE:

Axial computed tomography images were obtained through the head/brain 

without intravenous contrast.  



Radiation dose:



Total exam DLP = 960.83 mGy-cm.



This CT exam was performed using one or more of the following dose 

reduction techniques: Automated exposure control, adjustment of the 

mA and/or kV according to patient size, and/or use of iterative 

reconstruction technique.



FINDINGS:



HEMORRHAGE:

No intracranial hemorrhage. 



BRAIN:

Gray-white matter differentiation is preserved.  There is no mass, 

mass effect or abnormal extra-axial fluid collection.  There is no 

territorial infarction. The midline sagittal structures are normal.



VENTRICLES:

The ventricles are normal in size, shape and configuration.



CALVARIUM:





There is no calvarial fracture or extracranial soft tissue swelling.



PARANASAL SINUSES:

Predominantly clear.



MASTOID AIR CELLS:

Predominantly clear.



OTHER FINDINGS:

None.



IMPRESSION:

No acute intracranial abnormality.

## 2019-04-10 VITALS — OXYGEN SATURATION: 99 %
